# Patient Record
Sex: FEMALE | Race: WHITE | NOT HISPANIC OR LATINO | URBAN - METROPOLITAN AREA
[De-identification: names, ages, dates, MRNs, and addresses within clinical notes are randomized per-mention and may not be internally consistent; named-entity substitution may affect disease eponyms.]

---

## 2017-10-24 ENCOUNTER — OUTPATIENT (OUTPATIENT)
Dept: OUTPATIENT SERVICES | Facility: HOSPITAL | Age: 77
LOS: 1 days | End: 2017-10-24

## 2019-04-28 ENCOUNTER — EMERGENCY (EMERGENCY)
Facility: HOSPITAL | Age: 79
LOS: 1 days | End: 2019-04-28
Payer: MEDICARE

## 2019-04-28 PROCEDURE — 93010 ELECTROCARDIOGRAM REPORT: CPT

## 2019-04-28 PROCEDURE — 71045 X-RAY EXAM CHEST 1 VIEW: CPT | Mod: 26

## 2019-04-28 PROCEDURE — 99285 EMERGENCY DEPT VISIT HI MDM: CPT

## 2019-04-29 PROCEDURE — 93010 ELECTROCARDIOGRAM REPORT: CPT

## 2019-06-25 ENCOUNTER — OUTPATIENT (OUTPATIENT)
Dept: OUTPATIENT SERVICES | Facility: HOSPITAL | Age: 79
LOS: 1 days | End: 2019-06-25

## 2019-06-25 PROCEDURE — 71045 X-RAY EXAM CHEST 1 VIEW: CPT | Mod: 26

## 2019-06-25 PROCEDURE — 99285 EMERGENCY DEPT VISIT HI MDM: CPT

## 2019-06-25 PROCEDURE — 70450 CT HEAD/BRAIN W/O DYE: CPT | Mod: 26

## 2019-06-26 ENCOUNTER — INPATIENT (INPATIENT)
Facility: HOSPITAL | Age: 79
LOS: 4 days | Discharge: HOME HEALTH | End: 2019-07-01
Admitting: FAMILY MEDICINE
Payer: MEDICARE

## 2019-06-26 ENCOUNTER — OUTPATIENT (OUTPATIENT)
Dept: OUTPATIENT SERVICES | Facility: HOSPITAL | Age: 79
LOS: 1 days | End: 2019-06-26

## 2019-06-26 PROCEDURE — 93459 L HRT ART/GRFT ANGIO: CPT | Mod: 26

## 2019-06-27 ENCOUNTER — OUTPATIENT (OUTPATIENT)
Dept: OUTPATIENT SERVICES | Facility: HOSPITAL | Age: 79
LOS: 1 days | End: 2019-06-27

## 2019-06-27 PROBLEM — Z00.00 ENCOUNTER FOR PREVENTIVE HEALTH EXAMINATION: Status: ACTIVE | Noted: 2019-06-27

## 2019-06-27 PROCEDURE — 72192 CT PELVIS W/O DYE: CPT | Mod: 26

## 2019-06-27 PROCEDURE — 93010 ELECTROCARDIOGRAM REPORT: CPT

## 2019-06-27 PROCEDURE — 93880 EXTRACRANIAL BILAT STUDY: CPT | Mod: 26

## 2019-06-27 PROCEDURE — 72131 CT LUMBAR SPINE W/O DYE: CPT | Mod: 26

## 2019-06-28 ENCOUNTER — OUTPATIENT (OUTPATIENT)
Dept: OUTPATIENT SERVICES | Facility: HOSPITAL | Age: 79
LOS: 1 days | End: 2019-06-28

## 2019-06-29 PROCEDURE — 71045 X-RAY EXAM CHEST 1 VIEW: CPT | Mod: 26

## 2019-06-30 ENCOUNTER — OUTPATIENT (OUTPATIENT)
Dept: OUTPATIENT SERVICES | Facility: HOSPITAL | Age: 79
LOS: 1 days | End: 2019-06-30

## 2019-07-01 ENCOUNTER — OUTPATIENT (OUTPATIENT)
Dept: OUTPATIENT SERVICES | Facility: HOSPITAL | Age: 79
LOS: 1 days | End: 2019-07-01

## 2019-08-09 ENCOUNTER — APPOINTMENT (OUTPATIENT)
Dept: RADIOLOGY | Facility: CLINIC | Age: 79
End: 2019-08-09
Payer: MEDICARE

## 2019-08-09 PROCEDURE — 77080 DXA BONE DENSITY AXIAL: CPT

## 2019-09-10 ENCOUNTER — OUTPATIENT (OUTPATIENT)
Dept: OUTPATIENT SERVICES | Facility: HOSPITAL | Age: 79
LOS: 1 days | Discharge: HOME | End: 2019-09-10

## 2019-09-22 PROCEDURE — 99285 EMERGENCY DEPT VISIT HI MDM: CPT

## 2019-09-22 PROCEDURE — 71045 X-RAY EXAM CHEST 1 VIEW: CPT | Mod: 26

## 2019-09-22 PROCEDURE — 93010 ELECTROCARDIOGRAM REPORT: CPT

## 2019-09-24 ENCOUNTER — INPATIENT (INPATIENT)
Facility: HOSPITAL | Age: 79
LOS: 0 days | Discharge: HOME | End: 2019-09-25
Admitting: INTERNAL MEDICINE
Payer: MEDICARE

## 2019-09-24 ENCOUNTER — OUTPATIENT (OUTPATIENT)
Dept: OUTPATIENT SERVICES | Facility: HOSPITAL | Age: 79
LOS: 1 days | End: 2019-09-24

## 2019-09-25 ENCOUNTER — OUTPATIENT (OUTPATIENT)
Dept: OUTPATIENT SERVICES | Facility: HOSPITAL | Age: 79
LOS: 1 days | End: 2019-09-25

## 2019-10-01 ENCOUNTER — INPATIENT (INPATIENT)
Facility: HOSPITAL | Age: 79
LOS: 0 days | Discharge: ANOTHER HOSPITAL | End: 2019-10-02
Payer: MEDICARE

## 2019-10-01 ENCOUNTER — OUTPATIENT (OUTPATIENT)
Dept: OUTPATIENT SERVICES | Facility: HOSPITAL | Age: 79
LOS: 1 days | End: 2019-10-01

## 2019-10-01 PROCEDURE — 99285 EMERGENCY DEPT VISIT HI MDM: CPT

## 2019-10-01 PROCEDURE — 71045 X-RAY EXAM CHEST 1 VIEW: CPT | Mod: 26

## 2019-10-02 PROCEDURE — 93010 ELECTROCARDIOGRAM REPORT: CPT | Mod: 76

## 2019-12-02 ENCOUNTER — INPATIENT (INPATIENT)
Facility: HOSPITAL | Age: 79
LOS: 0 days | Discharge: ANOTHER HOSPITAL | End: 2019-12-03
Attending: INTERNAL MEDICINE | Admitting: INTERNAL MEDICINE
Payer: MEDICARE

## 2019-12-02 PROCEDURE — 71045 X-RAY EXAM CHEST 1 VIEW: CPT | Mod: 26

## 2019-12-02 PROCEDURE — 99223 1ST HOSP IP/OBS HIGH 75: CPT

## 2019-12-02 PROCEDURE — 99285 EMERGENCY DEPT VISIT HI MDM: CPT

## 2019-12-03 PROCEDURE — 93306 TTE W/DOPPLER COMPLETE: CPT | Mod: 26

## 2020-01-02 ENCOUNTER — OUTPATIENT (OUTPATIENT)
Dept: OUTPATIENT SERVICES | Facility: HOSPITAL | Age: 80
LOS: 1 days | End: 2020-01-02

## 2020-01-02 PROCEDURE — 71045 X-RAY EXAM CHEST 1 VIEW: CPT | Mod: 26

## 2020-01-02 PROCEDURE — 71260 CT THORAX DX C+: CPT | Mod: 26

## 2020-01-02 PROCEDURE — 74177 CT ABD & PELVIS W/CONTRAST: CPT | Mod: 26

## 2020-01-02 PROCEDURE — 72070 X-RAY EXAM THORAC SPINE 2VWS: CPT | Mod: 26

## 2020-01-02 PROCEDURE — 99285 EMERGENCY DEPT VISIT HI MDM: CPT

## 2020-01-02 PROCEDURE — 72170 X-RAY EXAM OF PELVIS: CPT | Mod: 26

## 2020-01-02 PROCEDURE — 70450 CT HEAD/BRAIN W/O DYE: CPT | Mod: 26

## 2020-01-02 PROCEDURE — 72125 CT NECK SPINE W/O DYE: CPT | Mod: 26

## 2020-01-03 ENCOUNTER — INPATIENT (INPATIENT)
Facility: HOSPITAL | Age: 80
LOS: 4 days | Discharge: NURSING, REHAB OR LONG-TERM CARE | End: 2020-01-08
Admitting: STUDENT IN AN ORGANIZED HEALTH CARE EDUCATION/TRAINING PROGRAM
Payer: MEDICARE

## 2020-01-03 PROCEDURE — 99221 1ST HOSP IP/OBS SF/LOW 40: CPT

## 2020-01-04 ENCOUNTER — OUTPATIENT (OUTPATIENT)
Dept: OUTPATIENT SERVICES | Facility: HOSPITAL | Age: 80
LOS: 1 days | End: 2020-01-04

## 2020-01-05 ENCOUNTER — OUTPATIENT (OUTPATIENT)
Dept: OUTPATIENT SERVICES | Facility: HOSPITAL | Age: 80
LOS: 1 days | End: 2020-01-05

## 2020-01-07 ENCOUNTER — OUTPATIENT (OUTPATIENT)
Dept: OUTPATIENT SERVICES | Facility: HOSPITAL | Age: 80
LOS: 1 days | End: 2020-01-07

## 2020-01-10 ENCOUNTER — OUTPATIENT (OUTPATIENT)
Dept: OUTPATIENT SERVICES | Facility: HOSPITAL | Age: 80
LOS: 1 days | End: 2020-01-10

## 2020-01-27 ENCOUNTER — OUTPATIENT (OUTPATIENT)
Dept: OUTPATIENT SERVICES | Facility: HOSPITAL | Age: 80
LOS: 1 days | End: 2020-01-27

## 2020-03-09 ENCOUNTER — OUTPATIENT (OUTPATIENT)
Dept: OUTPATIENT SERVICES | Facility: HOSPITAL | Age: 80
LOS: 1 days | End: 2020-03-09

## 2020-08-02 ENCOUNTER — OUTPATIENT (OUTPATIENT)
Dept: OUTPATIENT SERVICES | Facility: HOSPITAL | Age: 80
LOS: 1 days | End: 2020-08-02

## 2020-08-02 ENCOUNTER — INPATIENT (INPATIENT)
Facility: HOSPITAL | Age: 80
LOS: 0 days | Discharge: HOME | End: 2020-08-03
Attending: FAMILY MEDICINE | Admitting: FAMILY MEDICINE
Payer: MEDICARE

## 2020-08-02 PROCEDURE — 71045 X-RAY EXAM CHEST 1 VIEW: CPT | Mod: 26

## 2020-08-02 PROCEDURE — 99285 EMERGENCY DEPT VISIT HI MDM: CPT | Mod: CS

## 2020-08-03 ENCOUNTER — OUTPATIENT (OUTPATIENT)
Dept: OUTPATIENT SERVICES | Facility: HOSPITAL | Age: 80
LOS: 1 days | End: 2020-08-03

## 2020-09-10 ENCOUNTER — APPOINTMENT (OUTPATIENT)
Dept: RADIOLOGY | Facility: CLINIC | Age: 80
End: 2020-09-10
Payer: MEDICARE

## 2020-09-10 PROCEDURE — 74240 X-RAY XM UPR GI TRC 1CNTRST: CPT

## 2020-11-15 ENCOUNTER — TRANSCRIPTION ENCOUNTER (OUTPATIENT)
Age: 80
End: 2020-11-15

## 2021-03-16 ENCOUNTER — APPOINTMENT (OUTPATIENT)
Dept: CT IMAGING | Facility: CLINIC | Age: 81
End: 2021-03-16
Payer: MEDICARE

## 2021-03-16 PROCEDURE — 70498 CT ANGIOGRAPHY NECK: CPT | Mod: MH

## 2021-03-16 PROCEDURE — Q9967B: CUSTOM

## 2021-03-16 PROCEDURE — 82565A: CUSTOM | Mod: QW

## 2021-04-20 ENCOUNTER — APPOINTMENT (OUTPATIENT)
Dept: ULTRASOUND IMAGING | Facility: CLINIC | Age: 81
End: 2021-04-20
Payer: MEDICARE

## 2021-04-20 PROCEDURE — 93971 EXTREMITY STUDY: CPT | Mod: RT

## 2021-06-26 ENCOUNTER — EMERGENCY (EMERGENCY)
Facility: HOSPITAL | Age: 81
LOS: 1 days | End: 2021-06-26
Admitting: EMERGENCY MEDICINE
Payer: MEDICARE

## 2021-06-26 ENCOUNTER — INPATIENT (INPATIENT)
Facility: HOSPITAL | Age: 81
LOS: 3 days | Discharge: HOME | DRG: 378 | End: 2021-06-30
Attending: HOSPITALIST | Admitting: HOSPITALIST
Payer: MEDICARE

## 2021-06-26 VITALS
TEMPERATURE: 98 F | RESPIRATION RATE: 20 BRPM | HEART RATE: 85 BPM | DIASTOLIC BLOOD PRESSURE: 68 MMHG | HEIGHT: 62 IN | WEIGHT: 115.08 LBS | OXYGEN SATURATION: 97 % | SYSTOLIC BLOOD PRESSURE: 114 MMHG

## 2021-06-26 DIAGNOSIS — K92.2 GASTROINTESTINAL HEMORRHAGE, UNSPECIFIED: ICD-10-CM

## 2021-06-26 DIAGNOSIS — Z98.890 OTHER SPECIFIED POSTPROCEDURAL STATES: Chronic | ICD-10-CM

## 2021-06-26 DIAGNOSIS — Z95.1 PRESENCE OF AORTOCORONARY BYPASS GRAFT: Chronic | ICD-10-CM

## 2021-06-26 DIAGNOSIS — Z95.5 PRESENCE OF CORONARY ANGIOPLASTY IMPLANT AND GRAFT: Chronic | ICD-10-CM

## 2021-06-26 LAB
ALBUMIN SERPL ELPH-MCNC: 4.3 G/DL — SIGNIFICANT CHANGE UP (ref 3.3–5.2)
ALP SERPL-CCNC: 52 U/L — SIGNIFICANT CHANGE UP (ref 40–120)
ALT FLD-CCNC: 8 U/L — SIGNIFICANT CHANGE UP
ANION GAP SERPL CALC-SCNC: 13 MMOL/L — SIGNIFICANT CHANGE UP (ref 5–17)
APTT BLD: 32.7 SEC — SIGNIFICANT CHANGE UP (ref 27.5–35.5)
AST SERPL-CCNC: 15 U/L — SIGNIFICANT CHANGE UP
BASOPHILS # BLD AUTO: 0.05 K/UL — SIGNIFICANT CHANGE UP (ref 0–0.2)
BASOPHILS NFR BLD AUTO: 0.4 % — SIGNIFICANT CHANGE UP (ref 0–2)
BILIRUB SERPL-MCNC: 0.6 MG/DL — SIGNIFICANT CHANGE UP (ref 0.4–2)
BLD GP AB SCN SERPL QL: SIGNIFICANT CHANGE UP
BUN SERPL-MCNC: 43.4 MG/DL — HIGH (ref 8–20)
CALCIUM SERPL-MCNC: 10.1 MG/DL — SIGNIFICANT CHANGE UP (ref 8.6–10.2)
CHLORIDE SERPL-SCNC: 96 MMOL/L — LOW (ref 98–107)
CO2 SERPL-SCNC: 26 MMOL/L — SIGNIFICANT CHANGE UP (ref 22–29)
CREAT SERPL-MCNC: 0.98 MG/DL — SIGNIFICANT CHANGE UP (ref 0.5–1.3)
EOSINOPHIL # BLD AUTO: 0.1 K/UL — SIGNIFICANT CHANGE UP (ref 0–0.5)
EOSINOPHIL NFR BLD AUTO: 0.9 % — SIGNIFICANT CHANGE UP (ref 0–6)
GLUCOSE SERPL-MCNC: 85 MG/DL — SIGNIFICANT CHANGE UP (ref 70–99)
HCT VFR BLD CALC: 29.9 % — LOW (ref 34.5–45)
HCT VFR BLD CALC: 31.9 % — LOW (ref 34.5–45)
HGB BLD-MCNC: 10.9 G/DL — LOW (ref 11.5–15.5)
HGB BLD-MCNC: 9.9 G/DL — LOW (ref 11.5–15.5)
IMM GRANULOCYTES NFR BLD AUTO: 0.4 % — SIGNIFICANT CHANGE UP (ref 0–1.5)
INR BLD: 1.16 RATIO — SIGNIFICANT CHANGE UP (ref 0.88–1.16)
LYMPHOCYTES # BLD AUTO: 1.83 K/UL — SIGNIFICANT CHANGE UP (ref 1–3.3)
LYMPHOCYTES # BLD AUTO: 16.4 % — SIGNIFICANT CHANGE UP (ref 13–44)
MCHC RBC-ENTMCNC: 30.4 PG — SIGNIFICANT CHANGE UP (ref 27–34)
MCHC RBC-ENTMCNC: 30.7 PG — SIGNIFICANT CHANGE UP (ref 27–34)
MCHC RBC-ENTMCNC: 33.1 GM/DL — SIGNIFICANT CHANGE UP (ref 32–36)
MCHC RBC-ENTMCNC: 34.2 GM/DL — SIGNIFICANT CHANGE UP (ref 32–36)
MCV RBC AUTO: 89.9 FL — SIGNIFICANT CHANGE UP (ref 80–100)
MCV RBC AUTO: 91.7 FL — SIGNIFICANT CHANGE UP (ref 80–100)
MONOCYTES # BLD AUTO: 1.05 K/UL — HIGH (ref 0–0.9)
MONOCYTES NFR BLD AUTO: 9.4 % — SIGNIFICANT CHANGE UP (ref 2–14)
NEUTROPHILS # BLD AUTO: 8.12 K/UL — HIGH (ref 1.8–7.4)
NEUTROPHILS NFR BLD AUTO: 72.5 % — SIGNIFICANT CHANGE UP (ref 43–77)
NT-PROBNP SERPL-SCNC: 342 PG/ML — HIGH (ref 0–300)
PLATELET # BLD AUTO: 186 K/UL — SIGNIFICANT CHANGE UP (ref 150–400)
PLATELET # BLD AUTO: 198 K/UL — SIGNIFICANT CHANGE UP (ref 150–400)
POTASSIUM SERPL-MCNC: 4 MMOL/L — SIGNIFICANT CHANGE UP (ref 3.5–5.3)
POTASSIUM SERPL-SCNC: 4 MMOL/L — SIGNIFICANT CHANGE UP (ref 3.5–5.3)
PROT SERPL-MCNC: 6.8 G/DL — SIGNIFICANT CHANGE UP (ref 6.6–8.7)
PROTHROM AB SERPL-ACNC: 13.4 SEC — SIGNIFICANT CHANGE UP (ref 10.6–13.6)
RBC # BLD: 3.26 M/UL — LOW (ref 3.8–5.2)
RBC # BLD: 3.55 M/UL — LOW (ref 3.8–5.2)
RBC # FLD: 11.7 % — SIGNIFICANT CHANGE UP (ref 10.3–14.5)
RBC # FLD: 11.9 % — SIGNIFICANT CHANGE UP (ref 10.3–14.5)
SODIUM SERPL-SCNC: 135 MMOL/L — SIGNIFICANT CHANGE UP (ref 135–145)
TROPONIN T SERPL-MCNC: <0.01 NG/ML — SIGNIFICANT CHANGE UP (ref 0–0.06)
TROPONIN T SERPL-MCNC: <0.01 NG/ML — SIGNIFICANT CHANGE UP (ref 0–0.06)
WBC # BLD: 11.19 K/UL — HIGH (ref 3.8–10.5)
WBC # BLD: 12.06 K/UL — HIGH (ref 3.8–10.5)
WBC # FLD AUTO: 11.19 K/UL — HIGH (ref 3.8–10.5)
WBC # FLD AUTO: 12.06 K/UL — HIGH (ref 3.8–10.5)

## 2021-06-26 PROCEDURE — 99223 1ST HOSP IP/OBS HIGH 75: CPT | Mod: AI

## 2021-06-26 PROCEDURE — 99285 EMERGENCY DEPT VISIT HI MDM: CPT

## 2021-06-26 PROCEDURE — 71045 X-RAY EXAM CHEST 1 VIEW: CPT | Mod: 26

## 2021-06-26 PROCEDURE — 93010 ELECTROCARDIOGRAM REPORT: CPT

## 2021-06-26 PROCEDURE — 99223 1ST HOSP IP/OBS HIGH 75: CPT

## 2021-06-26 PROCEDURE — 99233 SBSQ HOSP IP/OBS HIGH 50: CPT

## 2021-06-26 PROCEDURE — 71045 X-RAY EXAM CHEST 1 VIEW: CPT | Mod: 26,76

## 2021-06-26 RX ORDER — ONDANSETRON 8 MG/1
4 TABLET, FILM COATED ORAL ONCE
Refills: 0 | Status: COMPLETED | OUTPATIENT
Start: 2021-06-26 | End: 2021-06-26

## 2021-06-26 RX ORDER — ONDANSETRON 8 MG/1
4 TABLET, FILM COATED ORAL EVERY 8 HOURS
Refills: 0 | Status: DISCONTINUED | OUTPATIENT
Start: 2021-06-26 | End: 2021-06-30

## 2021-06-26 RX ORDER — ATORVASTATIN CALCIUM 80 MG/1
10 TABLET, FILM COATED ORAL AT BEDTIME
Refills: 0 | Status: DISCONTINUED | OUTPATIENT
Start: 2021-06-26 | End: 2021-06-30

## 2021-06-26 RX ORDER — PANTOPRAZOLE SODIUM 20 MG/1
8 TABLET, DELAYED RELEASE ORAL
Qty: 80 | Refills: 0 | Status: DISCONTINUED | OUTPATIENT
Start: 2021-06-26 | End: 2021-06-29

## 2021-06-26 RX ORDER — ATORVASTATIN CALCIUM 80 MG/1
10 TABLET, FILM COATED ORAL DAILY
Refills: 0 | Status: DISCONTINUED | OUTPATIENT
Start: 2021-06-26 | End: 2021-06-26

## 2021-06-26 RX ORDER — LOSARTAN POTASSIUM 100 MG/1
100 TABLET, FILM COATED ORAL DAILY
Refills: 0 | Status: DISCONTINUED | OUTPATIENT
Start: 2021-06-26 | End: 2021-06-26

## 2021-06-26 RX ADMIN — ONDANSETRON 4 MILLIGRAM(S): 8 TABLET, FILM COATED ORAL at 15:02

## 2021-06-26 RX ADMIN — ATORVASTATIN CALCIUM 10 MILLIGRAM(S): 80 TABLET, FILM COATED ORAL at 20:32

## 2021-06-26 RX ADMIN — PANTOPRAZOLE SODIUM 10 MG/HR: 20 TABLET, DELAYED RELEASE ORAL at 15:02

## 2021-06-26 NOTE — H&P ADULT - ASSESSMENT
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 Additionally, reports BP lower than normal at home. Transferred to [facility] for further GI workup.     Melena   -trend H/H   -Hgb11.2 in am at [facility] [place]  -cont IV PPI   -holding asa, plavix   -f/u GI consult     Chest pain  CAD s/p CABG x3, s/p multiple PCI  PVD   HTN  -trend trops   -cardio recs appreciated   -holding asa, plavix   -f/u TTE   -cont lipitor   -pt on HCTZ-losartan outpt, will resume losartan if BP increasing

## 2021-06-26 NOTE — H&P ADULT - HISTORY OF PRESENT ILLNESS
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 Additionally, reports BP lower than normal at home. Transferred to [facility] for further GI workup.   Pt reports hx of multiple years of "GI problems," involving multiple BMs per day, abd cramping, intermittent nausea. Had been following with outpt GI but told she was high risk for EGD/colonoscopy. Denies ever having EGD or colonoscopy. Denies hx of BRBPR or melena until yesterday. At time of exam, pt reports chest pain resolved. denies dyspnea, though she has "not been doing anything." still with mild abd pain, nausea. Denies recent fevers, chills, dysuria

## 2021-06-26 NOTE — ED PROVIDER NOTE - ATTENDING CONTRIBUTION TO CARE
I, Herman Schwartz, performed a face to face bedside interview with this patient regarding history of present illness, review of symptoms and relevant past medical, social and family history.  I completed an independent physical examination. I have communicated the patient’s plan of care and disposition with the resident.  81 year old female with PMH CAD s/p CABG and multiple PCI, PAD, HTN, HLD, carotid endarterectomy [place] [facility] [place] Pt reports black stools starting yesterday. Mild abd cramping, but no vomitign, fevers, chills. also endroses chest pressure as well.  Gen: NAD, well appearing  CV: RRR  Pul: CTA b/l  Abd: Soft, non-distended, non-tender  Neuro: no focal deficits  Pt admitted for GI bleed

## 2021-06-26 NOTE — CONSULT NOTE ADULT - ATTENDING COMMENTS
Patient with chest pain likely secondary to demand ischemia in the setting of anemia and GI bleed  Trend cardiac enzymes, EKGs  Telemetry monitoring  TTE  Obtain records of patient's prior cardiac workup  Continue medications as above     Discussed with Dr. Mills    Thank you for allowing me to participate in the care of this patient  Will continue to follow

## 2021-06-26 NOTE — H&P ADULT - NSICDXPASTMEDICALHX_GEN_ALL_CORE_FT
PAST MEDICAL HISTORY:  Cholelithiasis     Coronary artery graft present     HLD (hyperlipidemia)     HTN (hypertension)     PVD (peripheral vascular disease)     SDH (subdural hematoma)

## 2021-06-26 NOTE — H&P ADULT - NSICDXPASTSURGICALHX_GEN_ALL_CORE_FT
PAST SURGICAL HISTORY:  History of right-sided carotid endarterectomy 2013    History of surgery for cerebral aneurysm s/p coiling of ruptured aneurysm, 2016    S/P CABG x 3 2013    S/P coronary artery stent placement x2 (2/2018), x1 (12/2018), x2 (2019), x1 (2019)

## 2021-06-26 NOTE — CONSULT NOTE ADULT - SUBJECTIVE AND OBJECTIVE BOX
HPI:  81y/oF PMH CAD s/p CABG x3 (3/2013), multiple PCI/HOLGER (most recently 10/2019), ruptured cerebral aneurysm (s/p coiling 5/2016), carotid stenosis (s/p R CEA 2013), PVD s/p iliac stents x3, HTN, HLD,  poss IBS [place] [facility] [place], dyspnea after 3 bouts of alleged melena, dark stools x 3 on 6/25.  . Additionally, reports BP lower than normal at home. Noted with Hgb of 13 then 11 and + FOBT [place] [facility]  Transferred to [facility] for further GI workup.   Pt reports hx of multiple years of "GI problems," involving multiple BMs per day, erratic BM's.  Some abd cramping, intermittent nausea. Had been following with Lyndsay Nelson,  but told she was high risk for EGD/colonoscopy. Denies ever having EGD or colonoscopy.Attempt at virtual colonoscopy was aborted due to intolerance of the prep.   Denies hx of BRBPR or melena until yesterday. At time of exam, pt reports chest pain resolved. Denies dyspnea, though she has "not been doing anything." still with mild abd pain, nausea. Denies recent fevers, chills, dysuria Denies heartburn or dyspepsia.  No dysphagia or odynophagia.  No recent weight changes.  Alleges that Cath 6 months ago was negative.  No recent use of iron or peptobismol.    Compliant with basic medications.        PAST MEDICAL & SURGICAL HISTORY:  HTN (hypertension)    HLD (hyperlipidemia)    PVD (peripheral vascular disease)    SDH (subdural hematoma)    Coronary artery graft present    Cholelithiasis; Hemorrhoids.     S/P CABG x 3  2013    History of right-sided carotid endarterectomy  2013    History of surgery for cerebral aneurysm  s/p coiling of ruptured aneurysm, 2016    S/P coronary artery stent placement  x2 (2/2018), x1 (12/2018), x2 (2019), x1 (2019)        ROS:  No Heartburn, regurgitation, dysphagia, odynophagia.  No dyspepsia  No abdominal pain.    No Nausea, vomiting.  No Bleeding.  No hematemesis.   No diarrhea.    No hematochezia.  No weight loss, anorexia.  No edema.      MEDICATIONS  (STANDING):  atorvastatin 10 milliGRAM(s) Oral at bedtime  pantoprazole Infusion 8 mG/Hr (10 mL/Hr) IV Continuous <Continuous>    MEDICATIONS  (PRN):  ondansetron Injectable 4 milliGRAM(s) IV Push every 8 hours PRN Nausea and/or Vomiting      Allergies  No Known Allergies    SOCIAL HISTORY:NC    FAMILY HISTORY:NC  No pertinent family history in first degree relatives        Vital Signs Last 24 Hrs  T(C): 36.6 (26 Jun 2021 16:09), Max: 36.7 (26 Jun 2021 11:54)  T(F): 97.9 (26 Jun 2021 16:09), Max: 98.1 (26 Jun 2021 11:54)  HR: 80 (26 Jun 2021 16:09) (80 - 85)  BP: 99/63 (26 Jun 2021 16:09) (99/63 - 114/68)  BP(mean): --  RR: 20 (26 Jun 2021 11:54) (20 - 20)  SpO2: 97% (26 Jun 2021 11:54) (97% - 97%)    PHYSICAL EXAM:    GENERAL: NAD, well-groomed, well-developed  HEAD:  Atraumatic, Normocephalic  EYES: EOMI, PERRLA, conjunctiva and sclera clear  ENMT: No tonsillar erythema, exudates, or enlargement; Moist mucous membranes, Good dentition, No lesions  NECK: Supple, No JVD, Normal thyroid  CHEST/LUNG: Clear to percussion bilaterally; No rales, rhonchi, wheezing, or rubs  HEART: Regular rate and rhythm; No murmurs, rubs, or gallops  ABDOMEN: Soft, Nontender, Nondistended; Bowel sounds present;  No HSM.  No MTR.  No hernias. KRISTEN:  + Ext Hemorrhoids.  No lesions Firm brown stool in rectal vault.  No impaction.  No lesions palpable.    EXTREMITIES:  2+ Peripheral Pulses, No clubbing, cyanosis, or edema  LYMPH: No lymphadenopathy noted  SKIN: No rashes or lesions      LABS:                        10.9   11.19 )-----------( 198      ( 26 Jun 2021 15:12 )             31.9     06-26    135  |  96<L>  |  43.4<H>  ----------------------------<  85  4.0   |  26.0  |  0.98    Ca    10.1      26 Jun 2021 15:13    TPro  6.8  /  Alb  4.3  /  TBili  0.6  /  DBili  x   /  AST  15  /  ALT  8   /  AlkPhos  52  06-26    PT/INR - ( 26 Jun 2021 15:13 )   PT: 13.4 sec;   INR: 1.16 ratio         PTT - ( 26 Jun 2021 15:13 )  PTT:32.7 sec       LIVER FUNCTIONS - ( 26 Jun 2021 15:13 )  Alb: 4.3 g/dL / Pro: 6.8 g/dL / ALK PHOS: 52 U/L / ALT: 8 U/L / AST: 15 U/L / GGT: x               RADIOLOGY & ADDITIONAL STUDIES:
                                                                    [facility]                                                               Office:  [address] [address]                                                              Telephone: [phone]. Fax:[phone]                                                                        CARDIOLOGY CONSULTATION NOTE                                                                                             Consult requested by:  Dr. Mills  Reason for Consultation: Chest Pain  Primary Cardiologist: [facility]  History obtained by: Patient and medical record   obtained: No    Covid Status: Pending    Chief complaint:    Patient is a 81y old  Female who presents with a chief complaint of GI bleed.      HPI: 80 y/o F with a PMHx of CAD s/p CABG x 3 (03/2013) and multiple PCI/HOLGER (last 12/03/19), cerebral aneurysm (s/p coiling 05/23/16), carotid stenosis (s/p right carotid endartectomy 03/2013), PVD (s/p multiple iliac stents, HTN, HLD, and IBS who presented to [facility] [place] with complaints of melena, chest pain, and dyspnea. Patient states that she has been having frequent bowel movements for years, and has been following with a GI doctor for IBS. Patient was told that she was high risk for an endoscopy or colonoscopy due to her comorbidities and has been managed conservatively. Patient states that she began yesterday to have multiple episodes of black stools, and felt like her blood pressure might be low. Patient states that she also began to have some chest pain and shortness of breath. Patient states she at baseline gets some chest discomfort when she exerts herself, and is prescribed NTG at home PRN. Patient was transferred to [facility] for further GI workup. Patient is resting comfortably, still has some abdominal pain and mild chest pressure. Patient's bloodwork is still pending. Patient denies fevers, chills, syncope, N/V, headache, or dizziness.     REVIEW OF SYMPTOMS:     CONSTITUTIONAL: No fever, weight loss, or fatigue  ENMT:  No difficulty hearing, tinnitus, vertigo; No sinus or throat pain  NECK: No pain or stiffness  CARDIOVASCULAR: AS PER HPI  RESPIRATORY: AS PER HPI  : No dysuria, no hematuria   GI: AS PER HPI  NEURO: No headache, no dizziness, no slurred speech   MUSCULOSKELETAL: No joint pain or swelling; No muscle, back, or extremity pain  PSYCH: No agitation, no anxiety.    ALL OTHER REVIEW OF SYSTEMS ARE NEGATIVE.      PREVIOUS DIAGNOSTIC TESTING  ECHO FINDINGS:  Pending      ALLERGIES: Allergies    No Known Allergies    Intolerances      PAST MEDICAL HISTORY  HTN (hypertension)    HLD (hyperlipidemia)    IBS (irritable bowel syndrome)    PAD (peripheral artery disease)    PVD (peripheral vascular disease)    SDH (subdural hematoma)    Coronary artery graft present        PAST SURGICAL HISTORY      FAMILY HISTORY:      SOCIAL HISTORY:   CIGARETTES:   Former smoker, quit 2013  ALCOHOL: Denies  DRUGS: Denies      CURRENT MEDICATIONS:     pantoprazole Infusion      HOME MEDICATIONS:  Home Medications:      Vital Signs Last 24 Hrs  T(C): 36.7 (26 Jun 2021 11:54), Max: 36.7 (26 Jun 2021 11:54)  T(F): 98.1 (26 Jun 2021 11:54), Max: 98.1 (26 Jun 2021 11:54)  HR: 85 (26 Jun 2021 11:54) (85 - 85)  BP: 114/68 (26 Jun 2021 11:54) (114/68 - 114/68)  RR: 20 (26 Jun 2021 11:54) (20 - 20)  SpO2: 97% (26 Jun 2021 11:54) (97% - 97%)      PHYSICAL EXAM:  Constitutional: Comfortable . No acute distress.   HEENT: Atraumatic and normocephalic , neck is supple . no JVD. No carotid bruit. PEERL   CNS: A&Ox3. No focal deficits. EOMI. Cranial nerves II-IX are intact.   Lymph Nodes: Cervical : Not palpable.  Respiratory: CTAB  Cardiovascular: S1S2 RRR. No rubs or gallop. III/VI systolic murmur lsb  Gastrointestinal: Soft non-tender and non distended . +Bowel sounds. negative Enamorado's sign.  Extremities: No edema.   Psychiatric: Calm . no agitation.  Skin: No skin rash/ulcers visualized to face, hands or feet.    Intake and output:     LABS:                        10.9   11.19 )-----------( 198      ( 26 Jun 2021 15:12 )             31.9             ;p-BNP=        INTERPRETATION OF TELEMETRY: Reviewed by me.   ECG: Reviewed by me. Pending    RADIOLOGY & ADDITIONAL STUDIES:    X-ray:  reviewed by me.   CT scan:   MRI:

## 2021-06-26 NOTE — H&P ADULT - NSHPPHYSICALEXAM_GEN_ALL_CORE
CONSTITUTIONAL: NAD  EYES: +EOMI  CARDIAC: Regular rate, regular rhythm.  normal +S1, S2.  +systolic murmur   RESPIRATORY: Clear to auscultation bilaterally, no wheezes noted  GASTROINTESTINAL: Abdomen soft, non-distended; mildly TTP umbilical/hypogastrium; no rebound   NEUROLOGICAL: Alert and oriented, no focal deficits.  SKIN: warm, dry, no rashes noted

## 2021-06-26 NOTE — H&P ADULT - NSHPLABSRESULTS_GEN_ALL_CORE
10.9   11.19 )-----------( 198      ( 26 Jun 2021 15:12 )             31.9   06-26    135  |  96<L>  |  43.4<H>  ----------------------------<  85  4.0   |  26.0  |  0.98    Ca    10.1      26 Jun 2021 15:13    TPro  6.8  /  Alb  4.3  /  TBili  0.6  /  DBili  x   /  AST  15  /  ALT  8   /  AlkPhos  52  06-26

## 2021-06-26 NOTE — ED PROVIDER NOTE - PMH
Coronary artery graft present    HLD (hyperlipidemia)    HTN (hypertension)    IBS (irritable bowel syndrome)    PAD (peripheral artery disease)    PVD (peripheral vascular disease)    SDH (subdural hematoma)

## 2021-06-26 NOTE — ED PROVIDER NOTE - OBJECTIVE STATEMENT
Pt is an 82 y/o F w/PMHx from chart review of HTN HLD, IBS, PAD s/p carotid enterectomy, hx of lower ext PVD, hx of SDH s/p rupture and repair (5/2016) complex cardiac history including CABG in 2013, multiple PCIs, redo PCI for LCx and diagonal branch for recurrent in-stent restenosis in 4/2019 and in 10/2019 presents as transfer from [facility] [place] c/o GI bleed.  Pt states she had three episodes of dark stool yesterday, and attributed it to her IBS.  Today she went outside and took her blood pressure and states that she thought it might be a little low, she routinely checks and know that she trends elevated, and has had near syncopal episodes in the past when her pressure was low.  She became concerned after experiencing some chest discomfort and shortness of breath coupled with her cardiac history and went to the ED for evaluation.  She was found to have a GI bleed, sarted on a protonix drip and transferred to [facility] for GI evaluation.

## 2021-06-26 NOTE — ED ADULT TRIAGE NOTE - CHIEF COMPLAINT QUOTE
Patient brought in by ambulance A/Ox3, transfer from [facility] for GI bleed, reports she feels nauseous, on protonix drip.

## 2021-06-26 NOTE — CONSULT NOTE ADULT - ASSESSMENT
Dark stools.  + FOBT;  Hx of IBS.  No prior EGD or colonoscopy exam.  Avg risk for CR neoplasia.    Minor anemia.  Not much change to date.    Brown stool on current exam.    Plan Serial BW.  Already on PPI drip.  Cards clearance.  EGD on Monday.  If negative the consider for colonoscopy.  
A/P: 82 y/o F with a PMHx of CAD s/p CABG x 3 (03/2013) and multiple PCI/HOLGER (last 12/03/19), cerebral aneurysm (s/p coiling 05/23/16), carotid stenosis (s/p right carotid endartectomy 03/2013), PVD (s/p multiple iliac stents, HTN, HLD, and IBS who [place] [facility] of melena, chest pain, and dyspnea. Patient states that she has been having frequent bowel movements for years, and has been following with a GI doctor for IBS. Patient was told that she was high risk for an endoscopy or colonoscopy due to her comorbidities and has been managed conservatively. Patient states that she began yesterday to have multiple episodes of black stools, and felt like her blood pressure might be low. Patient states that she also began to have some chest pain and shortness of breath. Patient states she at baseline gets some chest discomfort when she exerts herself, and is prescribed NTG at home PRN. Patient was transferred to [facility] for further GI workup. Patient is resting comfortably, still has some abdominal pain and mild chest pressure. Patient's bloodwork is still pending. Patient denies fevers, chills, syncope, N/V, headache, or dizziness.     Chest Pain  - Likely demand ischemia due to anemia and active GI bleeding.   - Patient with known native and bypass graft disease (last cath on record at [facility] 06/19 with mLAD 75% proximal to graft, LIMA-LAD minor luminal irregularities, pLCx 40% ISR, SVG-D1 40% proximal stenosis and 40% distal stenosis).   - Bloodwork pending, continue to trend troponins.   - Will obtain records from [facility] on Monday.   - Restart aspirin and plavix when cleared by GI, consult pending.   - Obtain TTE.   - Continue lipitor.   - If renal function stable, restart losartan.     Melena  - Concern for active GI bleeding.   - GI consult pending.     HTN  - BP well controlled off medications at this time.   - Restart home meds as tolerated.     Assessment and recommendations are final when note is signed by the attending.

## 2021-06-27 LAB
ABO RH CONFIRMATION: SIGNIFICANT CHANGE UP
ANION GAP SERPL CALC-SCNC: 15 MMOL/L — SIGNIFICANT CHANGE UP (ref 5–17)
APPEARANCE UR: CLEAR — SIGNIFICANT CHANGE UP
BACTERIA # UR AUTO: NEGATIVE — SIGNIFICANT CHANGE UP
BILIRUB UR-MCNC: NEGATIVE — SIGNIFICANT CHANGE UP
BUN SERPL-MCNC: 43.3 MG/DL — HIGH (ref 8–20)
CALCIUM SERPL-MCNC: 9.7 MG/DL — SIGNIFICANT CHANGE UP (ref 8.6–10.2)
CHLORIDE SERPL-SCNC: 99 MMOL/L — SIGNIFICANT CHANGE UP (ref 98–107)
CO2 SERPL-SCNC: 21 MMOL/L — LOW (ref 22–29)
COLOR SPEC: YELLOW — SIGNIFICANT CHANGE UP
COVID-19 SPIKE DOMAIN AB INTERP: POSITIVE
COVID-19 SPIKE DOMAIN ANTIBODY RESULT: >250 U/ML — HIGH
CREAT SERPL-MCNC: 1.26 MG/DL — SIGNIFICANT CHANGE UP (ref 0.5–1.3)
DIFF PNL FLD: NEGATIVE — SIGNIFICANT CHANGE UP
EPI CELLS # UR: SIGNIFICANT CHANGE UP
GLUCOSE SERPL-MCNC: 61 MG/DL — LOW (ref 70–99)
GLUCOSE UR QL: NEGATIVE MG/DL — SIGNIFICANT CHANGE UP
HCT VFR BLD CALC: 29.8 % — LOW (ref 34.5–45)
HGB BLD-MCNC: 9.8 G/DL — LOW (ref 11.5–15.5)
KETONES UR-MCNC: ABNORMAL
LEUKOCYTE ESTERASE UR-ACNC: ABNORMAL
LIDOCAIN IGE QN: 36 U/L — SIGNIFICANT CHANGE UP (ref 22–51)
MAGNESIUM SERPL-MCNC: 1.7 MG/DL — SIGNIFICANT CHANGE UP (ref 1.6–2.6)
MCHC RBC-ENTMCNC: 30.8 PG — SIGNIFICANT CHANGE UP (ref 27–34)
MCHC RBC-ENTMCNC: 32.9 GM/DL — SIGNIFICANT CHANGE UP (ref 32–36)
MCV RBC AUTO: 93.7 FL — SIGNIFICANT CHANGE UP (ref 80–100)
NITRITE UR-MCNC: NEGATIVE — SIGNIFICANT CHANGE UP
PH UR: 5 — SIGNIFICANT CHANGE UP (ref 5–8)
PLATELET # BLD AUTO: 175 K/UL — SIGNIFICANT CHANGE UP (ref 150–400)
POTASSIUM SERPL-MCNC: 4.9 MMOL/L — SIGNIFICANT CHANGE UP (ref 3.5–5.3)
POTASSIUM SERPL-SCNC: 4.9 MMOL/L — SIGNIFICANT CHANGE UP (ref 3.5–5.3)
PROT UR-MCNC: NEGATIVE MG/DL — SIGNIFICANT CHANGE UP
RBC # BLD: 3.18 M/UL — LOW (ref 3.8–5.2)
RBC # FLD: 11.8 % — SIGNIFICANT CHANGE UP (ref 10.3–14.5)
RBC CASTS # UR COMP ASSIST: SIGNIFICANT CHANGE UP /HPF (ref 0–4)
SARS-COV-2 IGG+IGM SERPL QL IA: >250 U/ML — HIGH
SARS-COV-2 IGG+IGM SERPL QL IA: POSITIVE
SODIUM SERPL-SCNC: 135 MMOL/L — SIGNIFICANT CHANGE UP (ref 135–145)
SP GR SPEC: 1.01 — SIGNIFICANT CHANGE UP (ref 1.01–1.02)
TROPONIN T SERPL-MCNC: <0.01 NG/ML — SIGNIFICANT CHANGE UP (ref 0–0.06)
UROBILINOGEN FLD QL: NEGATIVE MG/DL — SIGNIFICANT CHANGE UP
WBC # BLD: 8.59 K/UL — SIGNIFICANT CHANGE UP (ref 3.8–10.5)
WBC # FLD AUTO: 8.59 K/UL — SIGNIFICANT CHANGE UP (ref 3.8–10.5)
WBC UR QL: ABNORMAL

## 2021-06-27 PROCEDURE — 93306 TTE W/DOPPLER COMPLETE: CPT | Mod: 26

## 2021-06-27 PROCEDURE — 99233 SBSQ HOSP IP/OBS HIGH 50: CPT

## 2021-06-27 RX ORDER — MAGNESIUM SULFATE 500 MG/ML
1 VIAL (ML) INJECTION ONCE
Refills: 0 | Status: COMPLETED | OUTPATIENT
Start: 2021-06-27 | End: 2021-06-27

## 2021-06-27 RX ORDER — SODIUM CHLORIDE 9 MG/ML
1000 INJECTION, SOLUTION INTRAVENOUS
Refills: 0 | Status: DISCONTINUED | OUTPATIENT
Start: 2021-06-27 | End: 2021-06-27

## 2021-06-27 RX ORDER — SODIUM CHLORIDE 9 MG/ML
1000 INJECTION, SOLUTION INTRAVENOUS
Refills: 0 | Status: DISCONTINUED | OUTPATIENT
Start: 2021-06-27 | End: 2021-06-30

## 2021-06-27 RX ADMIN — PANTOPRAZOLE SODIUM 10 MG/HR: 20 TABLET, DELAYED RELEASE ORAL at 19:33

## 2021-06-27 RX ADMIN — Medication 100 GRAM(S): at 20:47

## 2021-06-27 RX ADMIN — ATORVASTATIN CALCIUM 10 MILLIGRAM(S): 80 TABLET, FILM COATED ORAL at 22:32

## 2021-06-27 NOTE — PROGRESS NOTE ADULT - ASSESSMENT
A/P: 80 y/o F with a PMHx of CAD s/p CABG x 3 (03/2013) and multiple PCI/HOLGER (last 12/03/19), cerebral aneurysm (s/p coiling 05/23/16), carotid stenosis (s/p right carotid endartectomy 03/2013), PVD (s/p multiple iliac stents, HTN, HLD, and IBS who presented to [facility] [place] with complaints of melena, chest pain, and dyspnea. Patient states that she has been having frequent bowel movements for years, and has been following with a GI doctor for IBS. Patient was told that she was high risk for an endoscopy or colonoscopy due to her comorbidities and has been managed conservatively. Patient states that she began yesterday to have multiple episodes of black stools, and felt like her blood pressure might be low. Patient states that she also began to have some chest pain and shortness of breath. Patient states she at baseline gets some chest discomfort when she exerts herself, and is prescribed NTG at home PRN. Patient was transferred to [facility] for further GI workup. Patient is resting comfortably, still has some abdominal pain and mild chest pressure. Patient's bloodwork is still pending. Patient denies fevers, chills, syncope, N/V, headache, or dizziness.     Chest Pain  - Likely demand ischemia due to anemia and active GI bleeding.   - Patient with known native and bypass graft disease (last cath on record at [facility] 06/19 with mLAD 75% proximal to graft, LIMA-LAD minor luminal irregularities, pLCx 40% ISR, SVG-D1 40% proximal stenosis and 40% distal stenosis).   - Troponins negative x 1.   - Will obtain records from [facility] on Monday.   - Restart aspirin and plavix when cleared by GI.  - Obtain TTE.   - Continue lipitor.   - If renal function stable, restart losartan.     Betty-operative Cardiac Risk Stratification   - RCRI 6.6% of major cardiac event.   - Patient with exertional anginal symptoms at this time likely demand ischemia from anemia and active bleeding.   - Troponins negative x 2.   - Echo still pending.   - Due to symptoms patient is at elevated risk of betty-procedural cardiac complications.   - Will try obtain outpatient Cardiology records.     Melena  - Concern for active GI bleeding.   - GI following.    HTN  - BP well controlled off medications at this time.   - Restart home meds as tolerated.     Assessment and recommendations are final when note is signed by the attending.

## 2021-06-27 NOTE — PROGRESS NOTE ADULT - ASSESSMENT
81y/oF PMH CAD s/p CABG x3 (3/2013), multiple PCI/HOLGER (most recently 12/2019), ruptured cerebral aneurysm (s/p coiling 5/2016), carotid stenosis (s/p R CEA 2013), PVD s/p iliac stents, HTN, HLD, poss IBS presenting to [facility] [place] with chest pain, dyspnea and c/o melena. Additionally, reports BP lower than normal at home. Transferred to [facility] for further GI workup.     Melena   -trend H/H   -cont IV PPI   -holding asa, plavix   -GI recs appreciated  -pending cards clearance for EGD Mon  -NPO after midnight     Chest pain  CAD s/p CABG x3, s/p multiple PCI  PVD   HTN  -trops neg   -cardio recs appreciated   -holding asa, plavix   -f/u TTE   -cont lipitor   -pt on HCTZ-losartan outpt, will resume losartan if BP increasing

## 2021-06-27 NOTE — PROGRESS NOTE ADULT - ASSESSMENT
Melena has resolved.  HGB slowly drifting downwards.  Awaiting Cards clearance;  Tentatively planned for EGD tomorrow.    ASA #3.  Seemingly optimized.  Will keep NPO p MN.

## 2021-06-27 NOTE — PROGRESS NOTE ADULT - ATTENDING COMMENTS
80 y/o F admitted with melena and chest pain, likely secondary to demand ischemia in the setting of anemia.   TTE pending  Obtain records of patient's cardiac history/prior workup  Management as above    Thank you for allowing me to participate in the care of this patient  Will continue to follow

## 2021-06-28 ENCOUNTER — TRANSCRIPTION ENCOUNTER (OUTPATIENT)
Age: 81
End: 2021-06-28

## 2021-06-28 LAB
ALBUMIN SERPL ELPH-MCNC: 3 G/DL — LOW (ref 3.3–5.2)
ALP SERPL-CCNC: 38 U/L — LOW (ref 40–120)
ALT FLD-CCNC: 7 U/L — SIGNIFICANT CHANGE UP
ANION GAP SERPL CALC-SCNC: 14 MMOL/L — SIGNIFICANT CHANGE UP (ref 5–17)
AST SERPL-CCNC: 16 U/L — SIGNIFICANT CHANGE UP
BILIRUB SERPL-MCNC: 0.4 MG/DL — SIGNIFICANT CHANGE UP (ref 0.4–2)
BUN SERPL-MCNC: 22.5 MG/DL — HIGH (ref 8–20)
CALCIUM SERPL-MCNC: 8.4 MG/DL — LOW (ref 8.6–10.2)
CHLORIDE SERPL-SCNC: 101 MMOL/L — SIGNIFICANT CHANGE UP (ref 98–107)
CO2 SERPL-SCNC: 20 MMOL/L — LOW (ref 22–29)
CREAT SERPL-MCNC: 0.82 MG/DL — SIGNIFICANT CHANGE UP (ref 0.5–1.3)
FERRITIN SERPL-MCNC: 195 NG/ML — HIGH (ref 15–150)
GLUCOSE SERPL-MCNC: 77 MG/DL — SIGNIFICANT CHANGE UP (ref 70–99)
HCT VFR BLD CALC: 27.7 % — LOW (ref 34.5–45)
HGB BLD-MCNC: 9.2 G/DL — LOW (ref 11.5–15.5)
IRON SATN MFR SERPL: 17 % — SIGNIFICANT CHANGE UP (ref 14–50)
IRON SATN MFR SERPL: 44 UG/DL — SIGNIFICANT CHANGE UP (ref 37–145)
MAGNESIUM SERPL-MCNC: 1.5 MG/DL — LOW (ref 1.6–2.6)
MCHC RBC-ENTMCNC: 30.5 PG — SIGNIFICANT CHANGE UP (ref 27–34)
MCHC RBC-ENTMCNC: 33.2 GM/DL — SIGNIFICANT CHANGE UP (ref 32–36)
MCV RBC AUTO: 91.7 FL — SIGNIFICANT CHANGE UP (ref 80–100)
PLATELET # BLD AUTO: 163 K/UL — SIGNIFICANT CHANGE UP (ref 150–400)
POTASSIUM SERPL-MCNC: 3.8 MMOL/L — SIGNIFICANT CHANGE UP (ref 3.5–5.3)
POTASSIUM SERPL-SCNC: 3.8 MMOL/L — SIGNIFICANT CHANGE UP (ref 3.5–5.3)
PROT SERPL-MCNC: 5.1 G/DL — LOW (ref 6.6–8.7)
RBC # BLD: 3.02 M/UL — LOW (ref 3.8–5.2)
RBC # FLD: 11.6 % — SIGNIFICANT CHANGE UP (ref 10.3–14.5)
SODIUM SERPL-SCNC: 135 MMOL/L — SIGNIFICANT CHANGE UP (ref 135–145)
TIBC SERPL-MCNC: 256 UG/DL — SIGNIFICANT CHANGE UP (ref 220–430)
TRANSFERRIN SERPL-MCNC: 179 MG/DL — LOW (ref 192–382)
WBC # BLD: 7.32 K/UL — SIGNIFICANT CHANGE UP (ref 3.8–10.5)
WBC # FLD AUTO: 7.32 K/UL — SIGNIFICANT CHANGE UP (ref 3.8–10.5)

## 2021-06-28 PROCEDURE — 99233 SBSQ HOSP IP/OBS HIGH 50: CPT

## 2021-06-28 PROCEDURE — 43235 EGD DIAGNOSTIC BRUSH WASH: CPT

## 2021-06-28 RX ORDER — MAGNESIUM SULFATE 500 MG/ML
2 VIAL (ML) INJECTION ONCE
Refills: 0 | Status: COMPLETED | OUTPATIENT
Start: 2021-06-28 | End: 2021-06-28

## 2021-06-28 RX ORDER — SOD SULF/SODIUM/NAHCO3/KCL/PEG
1000 SOLUTION, RECONSTITUTED, ORAL ORAL
Refills: 0 | Status: COMPLETED | OUTPATIENT
Start: 2021-06-28 | End: 2021-06-28

## 2021-06-28 RX ORDER — SOD SULF/SODIUM/NAHCO3/KCL/PEG
2000 SOLUTION, RECONSTITUTED, ORAL ORAL ONCE
Refills: 0 | Status: DISCONTINUED | OUTPATIENT
Start: 2021-06-28 | End: 2021-06-28

## 2021-06-28 RX ADMIN — ATORVASTATIN CALCIUM 10 MILLIGRAM(S): 80 TABLET, FILM COATED ORAL at 21:34

## 2021-06-28 RX ADMIN — PANTOPRAZOLE SODIUM 10 MG/HR: 20 TABLET, DELAYED RELEASE ORAL at 21:35

## 2021-06-28 RX ADMIN — Medication 1000 MILLILITER(S): at 18:40

## 2021-06-28 RX ADMIN — Medication 50 GRAM(S): at 09:05

## 2021-06-28 RX ADMIN — Medication 1000 MILLILITER(S): at 21:34

## 2021-06-28 NOTE — PATIENT PROFILE ADULT - NSPROHMSYMPCOND_GEN_A_NUR
Physician Discharge Summary     Patient ID:  Melissa Pichardo   89 year old    Admit date: 4/17/2017      Discharge date and time: 4/22/2017      Attending Physician: Brian Lopez MD      Discharge Diagnoses: Large bowel obstruction, resolved  Advanced COPD  Unsteady gait requiring a walker  Congestive heart failure, diastolic dysfunction  History of supraventricular tachycardia, resolved  Hypertension  Constipation  History of Crohn's disease, in remission  Coronary artery disease, episode of chest pain, myocardial infarction ruled out  Paroxysmal atrial fibrillation, currently clinically in sinus rhythm  Chronic iron deficiency anemia    Problem List:  Patient Active Problem List   Diagnosis   • Secondary membrane   • Chronic airway obstruction, not elsewhere classified   • Hypertension   • Dry eye syndrome   • Atrial fibrillation with RVR   • Osteoporosis   • Crohn's disease   • Celiac sprue   • COPD (chronic obstructive pulmonary disease)   • PVD (peripheral vascular disease)   • CAD (coronary artery disease)   • Hyperlipidemia   • NSTEMI (non-ST elevated myocardial infarction)   • MRSA (methicillin resistant staph aureus) culture positive   • Left carotid bruit   • Glaucoma suspect   • Systolic hypertension   • Diastolic dysfunction           Hospital Course: Patient was admitted to medical floor. She was kept n.p.o. She was given IV fluids. She has bowel obstruction. CT scan was done. Gen. surgery consult was done. Patient has previous surgeries due to Crohn's and she is at risk of developing bowel obstruction. Conservative medical treatment was recommended. She responded well to conservative treatment. She gradually had improvement in her abdomen pain and she started passing gas. Clear liquids were started and gradually advanced.  Patient has history of atrial fibrillation. She has SCD on the telemetry. She developed episode of chest pain. Serial troponins negative. Her BNP was borderline high. She was  given gentle diuresis with monitoring of electrolytes. EKG did not show any acute ischemic changes. Gradually her symptoms improved. Cardiology consulted. Dr. Swann recommended conservative treatment. Echo of the heart showed normal ejection fraction. No wall motion abnormality.  Once improved, physical therapy was consulted. It was determined that she is at her baseline.  were consulted. Patient lives alone with good social support and she does not need any special needs.  However she is awake. She is at risk of fall. She is unable to go out of home safely due to weakness, advanced age, arthritis, uses a walker, recent hospital admission. She will need monitoring of her cardiac status, monitoring of the blood pressure and pulse as well as abdomen symptoms. I recommended to see an home health services for short time to check on the safety issues as well as medication management and clinical evaluation to see if there is any decompensation of her cardiopulmonary status. She does have advanced COPD as well as coronary artery disease and CHF as well as advanced age. Patient agreed for short-term home health services.  She was advised to follow-up with PCP in about one week time. However patient to suggest that she wanted to follow-up with me and she was looking into switching his PCP to me as an outpatient also. Per patient she has inquired my office and was unable to get appointment couple of times. At patient's request will arrange follow-up with my office.  Education was done about the prevention of constipation with use of dietary changes like prune juice etc. as well as periodic use of MiraLAX powder. She was using Imodium which I recommend that she should never use since Imodium possibly may have contributed to her bowel obstruction situation. Patient agreed.          Consults: Dr. Swann cardiology  Dr. Jesusita Crooks. surgery    Significant Diagnostic Studies: CT scan of the abdomen, chest x-ray, echo of  the heart        Recent Labs  Lab 04/20/17  0340 04/19/17  1032 04/18/17  0522   WBC 5.8 5.3 6.0   HGB 10.1* 11.9* 10.4*   HCT 31.8* 37.9 33.1*    303 261       Recent Labs  Lab 04/20/17  0340   SODIUM 136   POTASSIUM 4.0   CHLORIDE 101   CO2 25   BUN 12   CREATININE 0.93   ALBUMIN 2.8*   BILIRUBIN 0.4   AST 22   GLUCOSE 110*     No results found      Discharge Exam:  Visit Vitals   • /50 (BP Location: [facility], Patient Position: Supine)   • Pulse 61   • Temp 98.2 °F (36.8 °C) (Temporal Artery)   • Resp 18   • Ht 5' 3\" (1.6 m)   • Wt 37.9 kg   • SpO2 93%   • BMI 14.8 kg/m2       General Appearance: Alert, cooperative, no distress, appears stated age    Head: Normocephalic, without obvious abnormality, atraumatic    Eyes: Conjunctiva/corneas clear    Neck: Supple, symmetrical, trachea midline, no adenopathy; thyroid    Lungs: Bilateral generalized reduced air entry without wheezing    Heart: Regular rate and rhythm, S1 and S2 normal, no murmur, No rub or gallop, no S3, PMI not displaced    Abdomen: Soft, non-tender, no lump palpable. Scar of previous surgery within normal limits. No incisional hernia.    Extremities: Extremities normal, atraumatic, no cyanosis or edema    Neurologic: CNII-XII intact. Normal strength, sensation and reflexes   throughout      Disposition: home    Patient Instructions:   Activity: as tolerated  Diet: as tolerated, low-salt, low-cholesterol diet.  Appropriate dietary and discharge instructions were provided. Patient's multiple questions were answered. Medications are done in a separate note.  Follow Up: With Dr. Lopez in 4-5 days per pt request    Time spent in discharge: 35 min    Signed:  Brian Lopez MD  4/22/2017    Home Health Eligibility Summary    I certify this patient under my care is eligible for Home Health Services and I performed a face to face encounter on 4/22/2017    The face to face encounter with the patient was in whole, or in part, for the following  medical condition(s) which is the primary reason for skilled home care:  COPD, Heart Failure and weakness, recent hospital admission for bowel obst..The clinical findings that support the initial home health plan of care include:medication regimen with monitoring needs for signs of decompensation/adverse events and medication management, impaired breathing and requiring teaching of energy conservation techniques/program, impaired mobility required assistive device training and exercise/strengthening program and impaired balance that requires therapeutic program for functional mobility deficits and home safety.     I certify, based on my findings that Skilled Nursing is medically necessary home health services.    The patient is confined to the home due to: arthritis and/or weakness which limits endurance and increases risk for falls, instability with dyspnea and fatigue, severe SOB/SOB upon exertion and inability to safely leave home unassisted.     I have authorized these skilled home health services.  Dr. HYMAN will accept and assume care for this patient and will sign the Home Health Plan of Care.        cardiovascular

## 2021-06-28 NOTE — PROGRESS NOTE ADULT - ASSESSMENT
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 Additionally, reports BP lower than normal at home. Transferred to [facility] for further GI workup.     Melena   -trend H/H   -cont IV PPI   -holding asa, plavix   -GI recs appreciated  -plan for EGD today 6/28    Chest pain  CAD s/p CABG x3, s/p multiple PCI  PVD   HTN  -trops neg   -cardio recs appreciated   -holding asa, plavix   -TTE:    -cont lipitor   -pt on HCTZ-losartan outpt, will resume losartan if BP increasing  81y/oF PMH CAD s/p CABG x3 (3/2013), multiple PCI/HOLGER (most recently 12/2019), ruptured cerebral aneurysm (s/p coiling 5/2016), carotid stenosis (s/p R CEA 2013), PVD s/p iliac stents, HTN, HLD, poss IBS [facility] with chest pain, dyspnea and c/o melena. Additionally, reports BP lower than normal at home. Transferred to [facility] for further GI workup.     Melena   -trend H/H   -cont IV PPI   -holding asa, plavix   -GI recs appreciated  -plan for EGD today 6/28    Chest pain  CAD s/p CABG x3, s/p multiple PCI  PVD   HTN  -trops neg   -cardio recs appreciated   -holding asa, plavix   -TTE:  LVEF: 50-55%, mild LVH, grade I ddx  -cont lipitor   -pt on HCTZ-losartan outpt, will resume losartan if BP increasing  81y/oF PMH CAD s/p CABG x3 (3/2013), multiple PCI/HOLGER (most recently 12/2019), ruptured cerebral aneurysm (s/p coiling 5/2016), carotid stenosis (s/p R CEA 2013), PVD s/p iliac stents, HTN, HLD, poss IBS presenting to [facility] with chest pain, dyspnea and c/o melena. Additionally, reports BP lower than normal at home. Transferred to [facility] for further GI workup.     Melena   -trend H/H   -cont IV PPI   -holding asa, plavix   -GI recs appreciated  -plan for EGD today 6/28    Chest pain  CAD s/p CABG x3, s/p multiple PCI  PVD   HTN  -trops neg   -cardio recs appreciated   -holding asa, plavix   -TTE:  LVEF: 50-55%, mild LVH, grade I ddx  -cont lipitor   -pt on HCTZ-losartan outpt, will resume losartan if BP increasing     Hypomagnesemia   -replace  -cont to monitor

## 2021-06-28 NOTE — PROGRESS NOTE ADULT - ASSESSMENT
A/P: 80 y/o F with a PMHx of CAD s/p CABG x 3 (03/2013) and multiple PCI/HOLGER (last 12/03/19), cerebral aneurysm (s/p coiling 05/23/16), carotid stenosis (s/p right carotid endartectomy 03/2013), PVD (s/p multiple iliac stents, HTN, HLD, and IBS who [facility] with complaints of melena, chest pain, and dyspnea. Patient states that she has been having frequent bowel movements for years, and has been following with a GI doctor for IBS. Patient was told that she was high risk for an endoscopy or colonoscopy due to her comorbidities and has been managed conservatively. Patient states that she began yesterday to have multiple episodes of black stools, and felt like her blood pressure might be low. Patient states that she also began to have some chest pain and shortness of breath. Patient states she at baseline gets some chest discomfort when she exerts herself, and is prescribed NTG at home PRN. Patient was transferred to [facility] for further GI workup. Patient is resting comfortably, still has some abdominal pain and mild chest pressure. Patient's bloodwork is still pending. Patient denies fevers, chills, syncope, N/V, headache, or dizziness.     Chest Pain  - Likely demand ischemia due to anemia and active GI bleeding.   - [facility] 08/2020 with patent grafts and stents, but with known native disease.   - No interventions at this time are indicated.   - Restart aspirin and plavix when cleared by GI.  - Echo with normal EF, no new RWMA, and no significant valvular abnormalities.   - Continue lipitor.   - If renal function stable, restart losartan.     Harleen-operative Cardiac Risk Stratification   - RCRI 6.6% of major cardiac event.   - Patient with exertional anginal symptoms at this time likely demand ischemia from anemia and active bleeding. Resolved.   - Troponins negative x 2.   - Echo with normal EF, no new RWMA, and no significant valvular abnormalities.   - [facility] 08/2020 with patent grafts and stents, but with known native disease.   - No active chest pain, evidence of ischemia, decompensated CHF, significant valvular abnormality, or unstable arrhythmia and therefore no absolute cardiac contraindication to the planned surgical procedure.    Melena  - Concern for active GI bleeding.   - GI following.    HTN  - BP well controlled off medications at this time.   - Restart home meds as tolerated.     Will sign off at this time, please feel free to call with any questions or issues that may arise.   Assessment and recommendations are final when note is signed by the attending.

## 2021-06-28 NOTE — PATIENT PROFILE ADULT - SAFE PLACE TO LIVE
Pre-Surgical Optimization Evaluation    Planned Procedure: Scheduled for Procedure(s):  RIGHT TOTAL KNEE ARTHROPLASTY on 9/6/2018 with  Surgeon(s):  Deric Sanches MDfor M17.11.     Planned Anesthesia: General w/Regional Block    Pre-Op Consultations:  PCP: Preop evaluation completed by Dr. Jorge on 08/31/2018. No further testing indicated.   Cardiology: Preop evaluation complete by Dr. Alatorre on 09/04/2018. No further testing indicated.     Pertinent Medical History:  1. Hx TIAs, remote  2. Hx Aortic dilatation as below  3. Hypertension, taking isosorbide     RCRI score: 1    Estimated body mass index is 25.31 kg/m² as calculated from the following:    Height as of 8/31/18: 5' 10\" (1.778 m).    Weight as of 8/31/18: 80 kg.    Previous Anesthesia History:  Negative for past history of problematic or difficult intubations.  Negative for personal history of prior anesthesia complications or reactions.  Negative for family history of anesthesic reactions.  Negative for personal history of bleeding or clotting disorders.  Negative for family history of bleeding or clotting disorders.    Pre-surgical optimization evaluation complete. Patient is low risk for intermediate risk procedure. Pertinent studies as follows.           8/10/18 NM Stress  1.  Normal myocardial perfusion scans during exercise and at rest.  2.  Normal post-exercise left ventricular systolic function.  3. Cardiac Risk Assessment: Low.   4. As compared to the prior study done on 10/20/2017, no significant change.        8/10/18 Echo  Normal left ventricular size, systolic function and wall thickness except basal septum which is 1.5cm, with no regional wall motion  abnormalities. Left ventricular ejection fraction, 57 %. Grade I/IV diastolic dysfunction.  Mildly increased right ventricular size. Normal right ventricular systolic function Right ventricular systolic pressure 21.2 mmHg.  Mild aortic valve stenosis, mean gradient 8 mmHg, OFE 1.8 cm²  (1.9cm last year). Trileaflet, thickened aortic valve. Mild  calcification. Mild aortic valve regurgitation.  Mild MR. Trace TR. MildPI.  Mildly to moderately dilated ascending aorta dimension: 4.4 cm just above aortic valve, unchanged.    10/14/2014 Cath  -The right coronary artery is dominate vessel originating from right sinus of valsalva. There is a 30-40% stenosis in mid portion. In the distal portion, there is a 30-40% stenosis followed by a 50-60% discrete stenosis. The right PDA and posterolateral branch have no significant disease.  -Left main trunk originating from the left sinus of valsalva, free of obstructive disease  -Left anterior descending had about 20% stenosis in its proximal portion of the vessel, minimal luminal irregularities. Diagonal branch had luminal irregularities   -Left circumflex artery is a codominant vessel with luminal irregularities. The first and second obtuse marginal branch had no significant disease. The third obtuse marginal branch has about 30% stenosis in its midportion. The posterolateral circumflex had luminal irregularities.   Impression:  Moderate to severe disease in the distal right coronary   Normal left ventricular systolic function  Dilated ascending aorta        CXR 08/27/2018  IMPRESSION:  1. No acute cardiopulmonary findings      Most Recent Laboratory Results:  Lab Results   Component Value Date    SODIUM 144 08/27/2018    POTASSIUM 4.4 08/27/2018    BUN 19 08/27/2018    CREATININE 0.85 08/27/2018    GLUCOSE 98 08/27/2018    WBC 7.7 08/27/2018    HGB 14.3 08/27/2018    HCT 41.6 08/27/2018     08/27/2018    INR 1.1 08/27/2018    PTT 29 08/27/2018    PT 11.6 08/27/2018       Past Medical History:      Unspecified essential hypertension                            Other and unspecified hyperlipidemia                          Unspecified asthma(493.90)                                    Osteoarthritis                                                Prostate  Cancer                                 01/01/1994      Comment: s/p prostatectomy and radiation therapy     Hyperparathyroidism (CMS/HCC)                                 Gout                                                          TIA (transient ischemic attack)                               Coronary artery disease                                        Past Surgical History:     REPAIR ING HERNIA,5+Y/O,REDUCIBL                11/08/2010      Comment: Hernia, inguinal    COLONOSCOPY DIAGNOSTIC                          01/01/2009      Comment: Colonoscopy, Dx    ECHO XTHORACIC OLINDA ANOM COMPLETE               05/20/2011    EKG REPORT                                      11/09/2011    RADICAL PROSTATECTOMY CPG                       1994          CATARACT EXTRACTION W/ INTRAOCULAR LENS IMPLANT                 Comment: right    LEFT HEART CATH,PERCUTANEOUS                    10/14/2014      Comment: moderate-to-severe disease in distal RCA;                normal left ventricular systolic function;                dilated ascending aorta    EXCISION OF LINGUAL TONSIL                                    APPENDECTOMY                                                  9/4/2018  Leana Fisher NP   Rafat Sexton no

## 2021-06-28 NOTE — PROGRESS NOTE ADULT - ATTENDING COMMENTS
80 y/o F admitted with melena and chest pain, likely secondary to demand ischemia in the setting of anemia.   TTE showed no significant abnormality  Prior records obtained; patient had [facility] in 8/2020 with patent grafts and stents  Patient does not need any further inpatient ischemic workup at this time  Further management as above    Thank you for allowing me to participate in the care of this patient  Please reconsult as needed

## 2021-06-29 LAB
ANION GAP SERPL CALC-SCNC: 19 MMOL/L — HIGH (ref 5–17)
BUN SERPL-MCNC: 21.5 MG/DL — HIGH (ref 8–20)
CALCIUM SERPL-MCNC: 9 MG/DL — SIGNIFICANT CHANGE UP (ref 8.6–10.2)
CHLORIDE SERPL-SCNC: 106 MMOL/L — SIGNIFICANT CHANGE UP (ref 98–107)
CO2 SERPL-SCNC: 19 MMOL/L — LOW (ref 22–29)
CREAT SERPL-MCNC: 0.92 MG/DL — SIGNIFICANT CHANGE UP (ref 0.5–1.3)
GLUCOSE SERPL-MCNC: 74 MG/DL — SIGNIFICANT CHANGE UP (ref 70–99)
HCT VFR BLD CALC: 30.3 % — LOW (ref 34.5–45)
HGB BLD-MCNC: 10.2 G/DL — LOW (ref 11.5–15.5)
MAGNESIUM SERPL-MCNC: 2.1 MG/DL — SIGNIFICANT CHANGE UP (ref 1.6–2.6)
MCHC RBC-ENTMCNC: 30.9 PG — SIGNIFICANT CHANGE UP (ref 27–34)
MCHC RBC-ENTMCNC: 33.7 GM/DL — SIGNIFICANT CHANGE UP (ref 32–36)
MCV RBC AUTO: 91.8 FL — SIGNIFICANT CHANGE UP (ref 80–100)
PLATELET # BLD AUTO: 206 K/UL — SIGNIFICANT CHANGE UP (ref 150–400)
POTASSIUM SERPL-MCNC: 4.3 MMOL/L — SIGNIFICANT CHANGE UP (ref 3.5–5.3)
POTASSIUM SERPL-SCNC: 4.3 MMOL/L — SIGNIFICANT CHANGE UP (ref 3.5–5.3)
RBC # BLD: 3.3 M/UL — LOW (ref 3.8–5.2)
RBC # FLD: 11.9 % — SIGNIFICANT CHANGE UP (ref 10.3–14.5)
SARS-COV-2 RNA SPEC QL NAA+PROBE: SIGNIFICANT CHANGE UP
SODIUM SERPL-SCNC: 144 MMOL/L — SIGNIFICANT CHANGE UP (ref 135–145)
WBC # BLD: 9.11 K/UL — SIGNIFICANT CHANGE UP (ref 3.8–10.5)
WBC # FLD AUTO: 9.11 K/UL — SIGNIFICANT CHANGE UP (ref 3.8–10.5)

## 2021-06-29 PROCEDURE — 99232 SBSQ HOSP IP/OBS MODERATE 35: CPT

## 2021-06-29 PROCEDURE — 45378 DIAGNOSTIC COLONOSCOPY: CPT

## 2021-06-29 PROCEDURE — 74177 CT ABD & PELVIS W/CONTRAST: CPT | Mod: 26

## 2021-06-29 RX ORDER — SODIUM CHLORIDE 9 MG/ML
1000 INJECTION, SOLUTION INTRAVENOUS
Refills: 0 | Status: DISCONTINUED | OUTPATIENT
Start: 2021-06-29 | End: 2021-06-30

## 2021-06-29 RX ORDER — PANTOPRAZOLE SODIUM 20 MG/1
40 TABLET, DELAYED RELEASE ORAL
Refills: 0 | Status: DISCONTINUED | OUTPATIENT
Start: 2021-06-29 | End: 2021-06-30

## 2021-06-29 RX ADMIN — SODIUM CHLORIDE 60 MILLILITER(S): 9 INJECTION, SOLUTION INTRAVENOUS at 09:26

## 2021-06-29 RX ADMIN — ATORVASTATIN CALCIUM 10 MILLIGRAM(S): 80 TABLET, FILM COATED ORAL at 21:09

## 2021-06-29 RX ADMIN — PANTOPRAZOLE SODIUM 40 MILLIGRAM(S): 20 TABLET, DELAYED RELEASE ORAL at 17:58

## 2021-06-29 NOTE — PROGRESS NOTE ADULT - ASSESSMENT
81y/oF PMH CAD s/p CABG x3 (3/2013), multiple PCI/HOLGER (most recently 12/2019), ruptured cerebral aneurysm (s/p coiling 5/2016), carotid stenosis (s/p R CEA 2013), PVD s/p iliac stents, HTN, HLD, poss IBS presenting to [facility] with chest pain, dyspnea and c/o melena. Additionally, reports BP lower than normal at home. Transferred to [facility] for further GI workup.     Melena   -trend H/H   -PPI BID  -holding asa, plavix   -GI recs appreciated  -s/p EGD 6/28: erosive gastritis, Grade B esophagitis in lower third of the esophagus, normal mucosa in duodenum; esophageal hiatal hernia   -plan for colonoscopy today 6/29    Chest pain  CAD s/p CABG x3, s/p multiple PCI  PVD   HTN  -trops neg   -cardio recs appreciated   -holding asa, plavix   -TTE:  LVEF: 50-55%, mild LVH, grade I ddx  -cont lipitor   -pt on HCTZ-losartan outpt, will resume losartan if BP increasing     Hypomagnesemia   -replace  -cont to monitor

## 2021-06-30 ENCOUNTER — TRANSCRIPTION ENCOUNTER (OUTPATIENT)
Age: 81
End: 2021-06-30

## 2021-06-30 VITALS
TEMPERATURE: 98 F | DIASTOLIC BLOOD PRESSURE: 70 MMHG | HEART RATE: 91 BPM | SYSTOLIC BLOOD PRESSURE: 119 MMHG | OXYGEN SATURATION: 96 %

## 2021-06-30 DIAGNOSIS — K92.1 MELENA: ICD-10-CM

## 2021-06-30 DIAGNOSIS — K57.90 DIVERTICULOSIS OF INTESTINE, PART UNSPECIFIED, WITHOUT PERFORATION OR ABSCESS WITHOUT BLEEDING: ICD-10-CM

## 2021-06-30 LAB
ANION GAP SERPL CALC-SCNC: 13 MMOL/L — SIGNIFICANT CHANGE UP (ref 5–17)
BUN SERPL-MCNC: 12.9 MG/DL — SIGNIFICANT CHANGE UP (ref 8–20)
CALCIUM SERPL-MCNC: 8.4 MG/DL — LOW (ref 8.6–10.2)
CHLORIDE SERPL-SCNC: 103 MMOL/L — SIGNIFICANT CHANGE UP (ref 98–107)
CO2 SERPL-SCNC: 23 MMOL/L — SIGNIFICANT CHANGE UP (ref 22–29)
CREAT SERPL-MCNC: 0.72 MG/DL — SIGNIFICANT CHANGE UP (ref 0.5–1.3)
GLUCOSE SERPL-MCNC: 87 MG/DL — SIGNIFICANT CHANGE UP (ref 70–99)
HCT VFR BLD CALC: 26.3 % — LOW (ref 34.5–45)
HCT VFR BLD CALC: 30.5 % — LOW (ref 34.5–45)
HGB BLD-MCNC: 10.1 G/DL — LOW (ref 11.5–15.5)
HGB BLD-MCNC: 8.9 G/DL — LOW (ref 11.5–15.5)
MAGNESIUM SERPL-MCNC: 1.6 MG/DL — SIGNIFICANT CHANGE UP (ref 1.6–2.6)
MCHC RBC-ENTMCNC: 30.4 PG — SIGNIFICANT CHANGE UP (ref 27–34)
MCHC RBC-ENTMCNC: 30.8 PG — SIGNIFICANT CHANGE UP (ref 27–34)
MCHC RBC-ENTMCNC: 33.1 GM/DL — SIGNIFICANT CHANGE UP (ref 32–36)
MCHC RBC-ENTMCNC: 33.8 GM/DL — SIGNIFICANT CHANGE UP (ref 32–36)
MCV RBC AUTO: 91 FL — SIGNIFICANT CHANGE UP (ref 80–100)
MCV RBC AUTO: 91.9 FL — SIGNIFICANT CHANGE UP (ref 80–100)
PLATELET # BLD AUTO: 183 K/UL — SIGNIFICANT CHANGE UP (ref 150–400)
PLATELET # BLD AUTO: 207 K/UL — SIGNIFICANT CHANGE UP (ref 150–400)
POTASSIUM SERPL-MCNC: 3.7 MMOL/L — SIGNIFICANT CHANGE UP (ref 3.5–5.3)
POTASSIUM SERPL-SCNC: 3.7 MMOL/L — SIGNIFICANT CHANGE UP (ref 3.5–5.3)
RBC # BLD: 2.89 M/UL — LOW (ref 3.8–5.2)
RBC # BLD: 3.32 M/UL — LOW (ref 3.8–5.2)
RBC # FLD: 11.9 % — SIGNIFICANT CHANGE UP (ref 10.3–14.5)
RBC # FLD: 11.9 % — SIGNIFICANT CHANGE UP (ref 10.3–14.5)
SODIUM SERPL-SCNC: 139 MMOL/L — SIGNIFICANT CHANGE UP (ref 135–145)
WBC # BLD: 13.32 K/UL — HIGH (ref 3.8–10.5)
WBC # BLD: 9.53 K/UL — SIGNIFICANT CHANGE UP (ref 3.8–10.5)
WBC # FLD AUTO: 13.32 K/UL — HIGH (ref 3.8–10.5)
WBC # FLD AUTO: 9.53 K/UL — SIGNIFICANT CHANGE UP (ref 3.8–10.5)

## 2021-06-30 PROCEDURE — 86900 BLOOD TYPING SEROLOGIC ABO: CPT

## 2021-06-30 PROCEDURE — 87635 SARS-COV-2 COVID-19 AMP PRB: CPT

## 2021-06-30 PROCEDURE — 83735 ASSAY OF MAGNESIUM: CPT

## 2021-06-30 PROCEDURE — 86850 RBC ANTIBODY SCREEN: CPT

## 2021-06-30 PROCEDURE — 99232 SBSQ HOSP IP/OBS MODERATE 35: CPT

## 2021-06-30 PROCEDURE — 74177 CT ABD & PELVIS W/CONTRAST: CPT

## 2021-06-30 PROCEDURE — 82728 ASSAY OF FERRITIN: CPT

## 2021-06-30 PROCEDURE — 84484 ASSAY OF TROPONIN QUANT: CPT

## 2021-06-30 PROCEDURE — 81001 URINALYSIS AUTO W/SCOPE: CPT

## 2021-06-30 PROCEDURE — 85610 PROTHROMBIN TIME: CPT

## 2021-06-30 PROCEDURE — C8929: CPT

## 2021-06-30 PROCEDURE — 86769 SARS-COV-2 COVID-19 ANTIBODY: CPT

## 2021-06-30 PROCEDURE — 71045 X-RAY EXAM CHEST 1 VIEW: CPT

## 2021-06-30 PROCEDURE — 85730 THROMBOPLASTIN TIME PARTIAL: CPT

## 2021-06-30 PROCEDURE — 36415 COLL VENOUS BLD VENIPUNCTURE: CPT

## 2021-06-30 PROCEDURE — 86901 BLOOD TYPING SEROLOGIC RH(D): CPT

## 2021-06-30 PROCEDURE — 83540 ASSAY OF IRON: CPT

## 2021-06-30 PROCEDURE — 83550 IRON BINDING TEST: CPT

## 2021-06-30 PROCEDURE — 83880 ASSAY OF NATRIURETIC PEPTIDE: CPT

## 2021-06-30 PROCEDURE — 80053 COMPREHEN METABOLIC PANEL: CPT

## 2021-06-30 PROCEDURE — 84466 ASSAY OF TRANSFERRIN: CPT

## 2021-06-30 PROCEDURE — 83690 ASSAY OF LIPASE: CPT

## 2021-06-30 PROCEDURE — 80048 BASIC METABOLIC PNL TOTAL CA: CPT

## 2021-06-30 PROCEDURE — 85025 COMPLETE CBC W/AUTO DIFF WBC: CPT

## 2021-06-30 PROCEDURE — 99239 HOSP IP/OBS DSCHRG MGMT >30: CPT

## 2021-06-30 PROCEDURE — 85027 COMPLETE CBC AUTOMATED: CPT

## 2021-06-30 PROCEDURE — 93005 ELECTROCARDIOGRAM TRACING: CPT

## 2021-06-30 RX ORDER — MAGNESIUM SULFATE 500 MG/ML
1 VIAL (ML) INJECTION ONCE
Refills: 0 | Status: COMPLETED | OUTPATIENT
Start: 2021-06-30 | End: 2021-06-30

## 2021-06-30 RX ORDER — PANTOPRAZOLE SODIUM 20 MG/1
1 TABLET, DELAYED RELEASE ORAL
Qty: 60 | Refills: 0
Start: 2021-06-30 | End: 2021-07-29

## 2021-06-30 RX ADMIN — Medication 100 GRAM(S): at 09:36

## 2021-06-30 RX ADMIN — PANTOPRAZOLE SODIUM 40 MILLIGRAM(S): 20 TABLET, DELAYED RELEASE ORAL at 06:30

## 2021-06-30 NOTE — DISCHARGE NOTE PROVIDER - NSDCMRMEDTOKEN_GEN_ALL_CORE_FT
atorvastatin 10 mg oral tablet: 1 tab(s) orally once a day  calcium (as carbonate) 600 mg oral tablet: 1 tab(s) orally once a day  nitroglycerin 0.4 mg sublingual tablet: 1 tab(s) sublingual every 5 minutes, As Needed  Protonix 40 mg oral delayed release tablet: 1 tab(s) orally 2 times a day  Slow-Mag 71.5 mg-119 mg oral delayed release tablet: 1 tab(s) orally once a day  Vitamin B-12 1000 mcg oral tablet: 1 tab(s) orally once a day  Vitamin C 500 mg oral tablet: 1 tab(s) orally once a day  Vitamin D3 2000 intl units (50 mcg) oral tablet: 1 tab(s) orally once a day

## 2021-06-30 NOTE — DISCHARGE NOTE NURSING/CASE MANAGEMENT/SOCIAL WORK - PATIENT PORTAL LINK FT
You can access the FollowMyHealth Patient Portal offered by [facility] by registering at the following website: [email or web address]. By joining [employer]’s FollowMyHealth portal, you will also be able to view your health information using other applications (apps) compatible with our system.

## 2021-06-30 NOTE — DISCHARGE NOTE PROVIDER - CARE PROVIDERS DIRECT ADDRESSES
[email or web address],DirectAddress_Unknown,DirectAddress_Unknown

## 2021-06-30 NOTE — DISCHARGE NOTE PROVIDER - CARE PROVIDER_API CALL
Aiden Ocampo)  Gastroenterology; Internal Medicine  [address], [address]  [place] [address]  Phone: [phone]  Fax: [phone]  Follow Up Time:     Blaine Zuniga  CARDIOVASCULAR DISEASE  [address] [place] [address]  Phone: [phone]  Fax: [phone]  Follow Up Time: 1-3 days    Tab Hubbard  INTERNAL MEDICINE  [address] [place] [address]  Phone: [phone]  Fax: [phone]  Follow Up Time:

## 2021-06-30 NOTE — DISCHARGE NOTE PROVIDER - PROVIDER TOKENS
PROVIDER:[TOKEN:[[id number]]],PROVIDER:[TOKEN:[[id number]],FOLLOWUP:[1-3 days]],PROVIDER:[TOKEN:[[id number]]]

## 2021-06-30 NOTE — DISCHARGE NOTE PROVIDER - HOSPITAL COURSE
81y/oF PMH CAD s/p CABG x3 (3/2013), multiple PCI/HOLGER (most recently 12/2019), ruptured cerebral aneurysm (s/p coiling 5/2016), carotid stenosis (s/p R CEA 2013), PVD s/p iliac stents, HTN, HLD, poss IBS presenting to [facility] with chest pain, dyspnea and c/o melena. Additionally, reports BP lower than normal at home. Transferred to [facility] for further GI workup. Now s/p EGD 6/28: erosive gastritis, Grade B esophagitis in lower third of the esophagus, normal mucosa in duodenum; esophageal hiatal hernia. S/p colonoscopy 6/29: otherwise normal colon. mild diverticulosis of sigmoid colon. grade/stage III internal hemorrhoids. Also had CT enterography with normal small bowel. 81y/oF PMH CAD s/p CABG x3 (3/2013), multiple PCI/HOLGER (most recently 12/2019), ruptured cerebral aneurysm (s/p coiling 5/2016), carotid stenosis (s/p R CEA 2013), PVD s/p iliac stents, HTN, HLD, poss IBS presenting to [facility] with chest pain, dyspnea and c/o melena. Additionally, reports BP lower than normal at home. Transferred to [facility] for further GI workup. Now s/p EGD 6/28: erosive gastritis, Grade B esophagitis in lower third of the esophagus, normal mucosa in duodenum; esophageal hiatal hernia. S/p colonoscopy 6/29: otherwise normal colon. mild diverticulosis of sigmoid colon. grade/stage III internal hemorrhoids. Also had CT enterography with normal small bowel.     Vital Signs Last 24 Hrs  T(C): 36.5 (30 Jun 2021 12:48), Max: 36.8 (30 Jun 2021 04:25)  T(F): 97.7 (30 Jun 2021 12:48), Max: 98.3 (30 Jun 2021 04:25)  HR: 98 (30 Jun 2021 12:48) (76 - 98)  BP: 95/65 (30 Jun 2021 12:48) (95/65 - 126/71)  BP(mean): --  RR: 18 (30 Jun 2021 12:48) (18 - 18)  SpO2: 90% (30 Jun 2021 12:48) (90% - 97%)    GENERAL: NAD  HEENT: PERRL, +EOMI  NECK: soft, supple  CHEST/LUNG: Clear to auscultation bilaterally; No wheezing  HEART: S1S2+, Regular rate and rhythm; +III/VI systolic murmur  ABDOMEN: Soft, Nontender, Nondistended; Bowel sounds present  SKIN: warm, dry  NEURO: AAOX3, no focal deficits  PSYCH: normal affect     33minutes spent on discharge

## 2021-06-30 NOTE — PROGRESS NOTE ADULT - SUBJECTIVE AND OBJECTIVE BOX
[place] [facility]                                                               Office: [address] [address]                                                              Telephone: [phone]. Fax:[phone]                                                                             PROGRESS NOTE  Reason for follow up: Chest Pain  Update: Obtained patient's records from [facility]. Patient had her last C 08/2020 with patent CABG grafts and no significant ISR. Patient states she has had no further episodes of chest pain. Echocardiogram shows normal EF, and no new RWMA. Plan for EGD.     Review of symptoms:   Cardiac:  No chest pain. No dyspnea. No palpitations.  Respiratory: No cough. No dyspnea  Gastrointestinal: No diarrhea. No abdominal pain. No bleeding.     Past medical history: No updates.   	  Vitals:  T(C): 36.5 (06-28-21 @ 04:43), Max: 36.8 (06-27-21 @ 22:23)  HR: 60 (06-28-21 @ 04:43) (60 - 72)  BP: 108/54 (06-28-21 @ 04:43) (108/54 - 117/65)  RR: 18 (06-28-21 @ 04:43) (18 - 18)  SpO2: 94% (06-28-21 @ 04:43) (94% - 96%)  Wt(kg): --  I&O's Summary    27 Jun 2021 07:01  -  28 Jun 2021 07:00  --------------------------------------------------------  IN: 680 mL / OUT: 1200 mL / NET: -520 mL      Weight (kg): 52.2 (06-26 @ 11:54)      PHYSICAL EXAM:  Constitutional: Comfortable . No acute distress.   HEENT: Atraumatic and normocephalic , neck is supple . no JVD. No carotid bruit. PEERL   CNS: A&Ox3. No focal deficits. EOMI. Cranial nerves II-IX are intact.   Lymph Nodes: Cervical : Not palpable.  Respiratory: CTAB  Cardiovascular: S1S2 RRR. No rubs or gallop. III/VI systolic murmur lsb  Gastrointestinal: Soft non-tender and non distended . +Bowel sounds. negative Enamorado's sign.  Extremities: No edema.   Psychiatric: Calm . no agitation.  Skin: No skin rash/ulcers visualized to face, hands or feet.        CURRENT MEDICATIONS:    pantoprazole Infusion  atorvastatin  lactated ringers.      DIAGNOSTIC TESTING:  [ ] Echocardiogram:   < from: TTE Echo Complete w/ Contrast w/ Doppler (06.27.21 @ 15:37) >  PHYSICIAN INTERPRETATION:  Left Ventricle: The left ventricular internal cavity size is normal. There is mild left ventricular hypertrophy involving the septal wall. The LVH involves septal walls.  Global LV systolic function was normal. Left ventricular ejection fraction, by visual estimation, is 50 to 55%. Normal segmental left ventricular systolic function. Spectral Doppler shows impaired relaxation pattern of left ventricular myocardial filling (Grade I diastolic dysfunction). Normal LV filling pressures.  Right Ventricle: The right ventricular size is normal. RV systolic function is normal. TV S' 0.1 m/s.  Left Atrium: Normal left atrial size.  Right Atrium: Normal right atrial size.  Pericardium: There is no evidence of pericardial effusion.  Mitral Valve: The mitral valve is normal in structure. Mild thickening of the anterior mitral valve leaflet. Mitral leaflet mobility is normal. There is mild mitral annular calcification. No evidence of mitral stenosis. Trace mitral valve regurgitation is seen.  Tricuspid Valve: Trivial tricuspid regurgitation is visualized. Insufficient TR to accurately assess RVSP.  Aortic Valve: The aortic valve is trileaflet. No evidence of aortic stenosis. Sclerotic aortic valve with normal opening. No evidence of aortic valve regurgitation is seen.  Pulmonic Valve: The pulmonic valve is normal. Trace pulmonic valve regurgitation.  Aorta: The aortic root and ascending aorta are structurally normal, with no evidence of dilitation.  Pulmonary Artery: The pulmonary artery is of normal size and origin.  Venous: The inferior vena cava is normal. The inferior vena cava was normal sized, with respiratory size variation greater than 50%.  In comparison to the previous echocardiogram(s): There are no prior studies on this patient for comparison purposes. No prior studies are available for comparison.      Summary:   1. Left ventricular ejection fraction, by visual estimation, is 50 to 55%. LV Ejection Fraction by Coats's Method with a biplane EF of 53 %.   2. Normal global left ventricular systolic function.   3. There is mild left ventricular hypertrophy.   4. Spectral Doppler shows impaired relaxation pattern of left ventricular myocardial filling (Grade I diastolic dysfunction).   5. Normal left atrial size.   6. Normal right atrial size.   7. Mild mitral annular calcification.   8. Mild thickening of the anterior mitral valve leaflet.   9. Trace mitral valve regurgitation.  10. Insufficient TR to accurately assess RVSP.  11. Sclerotic aortic valve with normal opening.  12. There is no evidence of pericardial effusion.  13. No prior studies are available for comparison.    MARIA G Corbett Electronically signed on 6/27/2021 at 6:51:24 PM    < end of copied text >    [ ]  Catheterization:  [facility] 08/06/2020  LM: normal  pLAD 100%  Cx normal  % RCA    LIMA-LAD patent.   SVG to Diag 1 patent     OTHER: 	      LABS:	 	  CARDIAC MARKERS ( 27 Jun 2021 09:05 )  x     / <0.01 ng/mL / x     / x     / x      p-BNP 27 Jun 2021 09:05: x    , CARDIAC MARKERS ( 26 Jun 2021 22:05 )  x     / <0.01 ng/mL / x     / x     / x      p-BNP 26 Jun 2021 22:05: x    , CARDIAC MARKERS ( 26 Jun 2021 15:13 )  x     / <0.01 ng/mL / x     / x     / x      p-BNP 26 Jun 2021 15:13: 342 pg/mL                          9.2    7.32  )-----------( 163      ( 28 Jun 2021 07:29 )             27.7     06-28    135  |  101  |  22.5<H>  ----------------------------<  77  3.8   |  20.0<L>  |  0.82    Ca    8.4<L>      28 Jun 2021 07:29  Mg     1.5     06-28    TPro  5.1<L>  /  Alb  3.0<L>  /  TBili  0.4  /  DBili  x   /  AST  16  /  ALT  7   /  AlkPhos  38<L>  06-28    proBNP: Serum Pro-Brain Natriuretic Peptide: 342 pg/mL (06-26 @ 15:13)    Lipid Profile:   HgA1c:   TSH:       TELEMETRY: Reviewed  SR 1st degree, 1 episode of Wenckebach         
                                                               [place] [facility]                                                               Office: [address] [address]                                                              Telephone: [phone]. Fax:[phone]                                                                             PROGRESS NOTE  Reason for follow up: Chest Pain  Update: Patient's troponins negative x 2 overnight, but patient states that she is feeling very weak. When patient ambulated the short distance to the bathroom she states she had some chest pressure and shortness of breath that resolved when she rested. Patient is nervous about the Endoscopy, and states she wants to consider transferring to [place] where she had all of her previous CABG/PCI performed. No active chest pain at this time.     Review of symptoms:   Cardiac:  + chest pain. + dyspnea. No palpitations.  Respiratory: No cough. No dyspnea  Gastrointestinal: No diarrhea. No abdominal pain. No bleeding.     Past medical history: No updates.   	  Vitals:  T(C): 36.4 (06-27-21 @ 05:35), Max: 37.1 (06-26-21 @ 19:25)  HR: 88 (06-27-21 @ 05:35) (80 - 104)  BP: 112/55 (06-27-21 @ 05:35) (99/63 - 114/68)  RR: 18 (06-27-21 @ 05:35) (18 - 20)  SpO2: 97% (06-27-21 @ 05:35) (97% - 97%)  Wt(kg): --  I&O's Summary    26 Jun 2021 07:01  -  27 Jun 2021 07:00  --------------------------------------------------------  IN: 110 mL / OUT: 450 mL / NET: -340 mL      Weight (kg): 52.2 (06-26 @ 11:54)      PHYSICAL EXAM:  Constitutional: Comfortable . No acute distress.   HEENT: Atraumatic and normocephalic , neck is supple . no JVD. No carotid bruit. PEERL   CNS: A&Ox3. No focal deficits. EOMI. Cranial nerves II-IX are intact.   Lymph Nodes: Cervical : Not palpable.  Respiratory: CTAB  Cardiovascular: S1S2 RRR. No rubs or gallop. III/VI systolic murmur lsb  Gastrointestinal: Soft non-tender and non distended . +Bowel sounds. negative Enamorado's sign.  Extremities: No edema.   Psychiatric: Calm . no agitation.  Skin: No skin rash/ulcers visualized to face, hands or feet.      CURRENT MEDICATIONS:    pantoprazole Infusion  atorvastatin  lactated ringers.      DIAGNOSTIC TESTING:  [ ] Echocardiogram: Pending    OTHER: 	      LABS:	 	  CARDIAC MARKERS ( 26 Jun 2021 22:05 )  x     / <0.01 ng/mL / x     / x     / x      p-BNP 26 Jun 2021 22:05: x    , CARDIAC MARKERS ( 26 Jun 2021 15:13 )  x     / <0.01 ng/mL / x     / x     / x      p-BNP 26 Jun 2021 15:13: 342 pg/mL                          9.9    12.06 )-----------( 186      ( 26 Jun 2021 22:05 )             29.9     06-26    135  |  96<L>  |  43.4<H>  ----------------------------<  85  4.0   |  26.0  |  0.98    Ca    10.1      26 Jun 2021 15:13    TPro  6.8  /  Alb  4.3  /  TBili  0.6  /  DBili  x   /  AST  15  /  ALT  8   /  AlkPhos  52  06-26    proBNP: Serum Pro-Brain Natriuretic Peptide: 342 pg/mL (06-26 @ 15:13)    Lipid Profile:   HgA1c:   TSH:       TELEMETRY: Reviewed  SR, PACs, PVCs,   ECG:  Reviewed by me. 	      
OLU ERNA    [id number]    81y      Female    CC: melena     INTERVAL HPI/OVERNIGHT EVENTS: pt seen and examined. c/o general fatigue. s/p tte. npo for egd     REVIEW OF SYSTEMS:    CONSTITUTIONAL: No fever, weight loss  RESPIRATORY: No cough, wheezing, hemoptysis; No shortness of breath  CARDIOVASCULAR: No chest pain, palpitations  GASTROINTESTINAL: No abdominal or epigastric pain. No nausea, vomiting  NEUROLOGICAL: No headaches, memory loss    Vital Signs Last 24 Hrs  T(C): 36.5 (2021 13:02), Max: 36.8 (2021 22:23)  T(F): 97.7 (2021 13:02), Max: 98.2 (2021 22:23)  HR: 70 (2021 13:02) (60 - 72)  BP: 149/72 (2021 13:02) (108/54 - 149/72)  BP(mean): --  RR: 18 (2021 13:02) (18 - 18)  SpO2: 92% (2021 13:02) (92% - 96%)    PHYSICAL EXAM:    GENERAL: NAD  HEENT: PERRL, +EOMI  NECK: soft, supple  CHEST/LUNG: Clear to auscultation bilaterally; No wheezing  HEART: S1S2+, Regular rate and rhythm; +III/VI systolic murmur  ABDOMEN: Soft, Nontender, Nondistended; Bowel sounds present  SKIN: warm, dry  NEURO: AAOX3, no focal deficits  PSYCH: normal affect     LABS:                        9.2    7.32  )-----------( 163      ( 2021 07:29 )             27.7         135  |  101  |  22.5<H>  ----------------------------<  77  3.8   |  20.0<L>  |  0.82    Ca    8.4<L>      2021 07:29  Mg     1.5         TPro  5.1<L>  /  Alb  3.0<L>  /  TBili  0.4  /  DBili  x   /  AST  16  /  ALT  7   /  AlkPhos  38<L>      PT/INR - ( 2021 15:13 )   PT: 13.4 sec;   INR: 1.16 ratio         PTT - ( 2021 15:13 )  PTT:32.7 sec  Urinalysis Basic - ( 2021 01:20 )    Color: Yellow / Appearance: Clear / S.015 / pH: x  Gluc: x / Ketone: Trace  / Bili: Negative / Urobili: Negative mg/dL   Blood: x / Protein: Negative mg/dL / Nitrite: Negative   Leuk Esterase: Trace / RBC: 0-2 /HPF / WBC 6-10   Sq Epi: x / Non Sq Epi: Occasional / Bacteria: Negative          MEDICATIONS  (STANDING):  atorvastatin 10 milliGRAM(s) Oral at bedtime  lactated ringers. 1000 milliLiter(s) (75 mL/Hr) IV Continuous <Continuous>  pantoprazole Infusion 8 mG/Hr (10 mL/Hr) IV Continuous <Continuous>    MEDICATIONS  (PRN):  ondansetron Injectable 4 milliGRAM(s) IV Push every 8 hours PRN Nausea and/or Vomiting      RADIOLOGY & ADDITIONAL TESTS:    < from: TTE Echo Complete w/ Contrast w/ Doppler (21 @ 15:37) >  Summary:   1. Left ventricular ejection fraction, by visual estimation, is 50 to 55%. LV Ejection Fraction by Coats's Method with a biplane EF of 53 %.   2. Normal global left ventricular systolic function.   3. There is mild left ventricular hypertrophy.   4. Spectral Doppler shows impaired relaxation pattern of left ventricular myocardial filling (Grade I diastolic dysfunction).   5. Normal left atrial size.   6. Normal right atrial size.   7. Mild mitral annular calcification.   8. Mild thickening of the anterior mitral valve leaflet.   9. Trace mitral valve regurgitation.  10. Insufficient TR to accurately assess RVSP.  11. Sclerotic aortic valve with normal opening.  12. There is no evidence of pericardial effusion.  13. No prior studies are available for comparison.    MARIA G Corbett Electronically signed on 2021 at 6:51:24 PM    < end of copied text >  
OLU UMANZOR    [id number]    81y      Female    CC: melena     INTERVAL HPI/OVERNIGHT EVENTS: Pt seen and examined. c/o fatigue, some midsternal and epigastric "burning" pain. denies vomiting, no BM since arrival     REVIEW OF SYSTEMS:    CONSTITUTIONAL: No fever, weight loss  RESPIRATORY: No cough, wheezing, hemoptysis; No shortness of breath  CARDIOVASCULAR: No chest pain, palpitations  GASTROINTESTINAL:  No vomiting  NEUROLOGICAL: No headaches    Vital Signs Last 24 Hrs  T(C): 36.4 (2021 05:35), Max: 37.1 (2021 19:25)  T(F): 97.5 (2021 05:35), Max: 98.7 (2021 19:25)  HR: 88 (2021 05:35) (80 - 104)  BP: 112/55 (2021 05:35) (99/63 - 114/68)  BP(mean): --  RR: 18 (2021 05:35) (18 - 20)  SpO2: 97% (2021 05:35) (97% - 97%)    PHYSICAL EXAM:    GENERAL: NAD  HEENT: PERRL, +EOMI  NECK: soft, supple  CHEST/LUNG: Clear to auscultation bilaterally; No wheezing  HEART: S1S2+, Regular rate and rhythm; +III/VI systolic murmur   ABDOMEN: Soft, Nondistended; Bowel sounds present; mildly tender to epigastrium  SKIN: warm, dry  NEURO: AAOX3, no focal deficits  PSYCH: normal affect     LABS:                        9.8    8.59  )-----------( 175      ( 2021 09:05 )             29.8         135  |  99  |  43.3<H>  ----------------------------<  61<L>  4.9   |  21.0<L>  |  1.26    Ca    9.7      2021 09:05  Mg     1.7         TPro  6.8  /  Alb  4.3  /  TBili  0.6  /  DBili  x   /  AST  15  /  ALT  8   /  AlkPhos  52      PT/INR - ( 2021 15:13 )   PT: 13.4 sec;   INR: 1.16 ratio         PTT - ( 2021 15:13 )  PTT:32.7 sec  Urinalysis Basic - ( 2021 01:20 )    Color: Yellow / Appearance: Clear / S.015 / pH: x  Gluc: x / Ketone: Trace  / Bili: Negative / Urobili: Negative mg/dL   Blood: x / Protein: Negative mg/dL / Nitrite: Negative   Leuk Esterase: Trace / RBC: 0-2 /HPF / WBC 6-10   Sq Epi: x / Non Sq Epi: Occasional / Bacteria: Negative          MEDICATIONS  (STANDING):  atorvastatin 10 milliGRAM(s) Oral at bedtime  lactated ringers. 1000 milliLiter(s) (75 mL/Hr) IV Continuous <Continuous>  magnesium sulfate  IVPB 1 Gram(s) IV Intermittent once  pantoprazole Infusion 8 mG/Hr (10 mL/Hr) IV Continuous <Continuous>    MEDICATIONS  (PRN):  ondansetron Injectable 4 milliGRAM(s) IV Push every 8 hours PRN Nausea and/or Vomiting      RADIOLOGY & ADDITIONAL TESTS:  
Patient seen and examined; chart reviewed.  Recently notes no further bleeding.  No BM today.  No N/v.  Tolerates CLD.    Had Echo today.  Not yet cardiac cleared.  Still with some low grade symptoms.  Comfortable at rest.  Hgb has drifted down to 9.8.  BUN is unchanged at 44.  Cr normal at 1.3.  PPI drip in place.      PAST MEDICAL & SURGICAL HISTORY:  HTN (hypertension)    HLD (hyperlipidemia)    PVD (peripheral vascular disease)    SDH (subdural hematoma)    Coronary artery graft present    Cholelithiasis    S/P CABG x 3  2013    History of right-sided carotid endarterectomy  2013    History of surgery for cerebral aneurysm  s/p coiling of ruptured aneurysm, 2016    S/P coronary artery stent placement  x2 (2018), x1 (2018), x2 (), x1 ()        ROS:  No Heartburn, regurgitation, dysphagia, odynophagia.  No dyspepsia  No abdominal pain.    No Nausea, vomiting.  No Bleeding.  No hematemesis.   No diarrhea.    No hematochesia.  No weight loss, anorexia.  No edema.      MEDICATIONS  (STANDING):  atorvastatin 10 milliGRAM(s) Oral at bedtime  lactated ringers. 1000 milliLiter(s) (75 mL/Hr) IV Continuous <Continuous>  magnesium sulfate  IVPB 1 Gram(s) IV Intermittent once  pantoprazole Infusion 8 mG/Hr (10 mL/Hr) IV Continuous <Continuous>    MEDICATIONS  (PRN):  ondansetron Injectable 4 milliGRAM(s) IV Push every 8 hours PRN Nausea and/or Vomiting      Allergies    No Known Allergies    Intolerances        Vital Signs Last 24 Hrs  T(C): 36.6 (2021 16:55), Max: 37.1 (2021 19:25)  T(F): 97.8 (2021 16:55), Max: 98.7 (2021 19:25)  HR: 72 (2021 16:55) (72 - 104)  BP: 117/65 (2021 16:55) (105/68 - 117/65)  BP(mean): --  RR: 18 (2021 16:55) (18 - 18)  SpO2: 96% (2021 16:55) (96% - 97%)    PHYSICAL EXAM:    GENERAL: NAD, well-groomed, well-developed  HEAD:  Atraumatic, Normocephalic  EYES: EOMI, PERRLA, conjunctiva and sclera clear  ENMT: No tonsillar erythema, exudates, or enlargement; Moist mucous membranes, Good dentition, No lesions  NECK: Supple, No JVD, Normal thyroid  CHEST/LUNG: Clear to percussion bilaterally; No rales, rhonchi, wheezing, or rubs  HEART: Regular rate and rhythm; No murmurs, rubs, or gallops  ABDOMEN: Soft, Nontender, Nondistended; Bowel sounds present  EXTREMITIES:  2+ Peripheral Pulses, No clubbing, cyanosis, or edema  LYMPH: No lymphadenopathy noted  SKIN: No rashes or lesions      LABS:                        9.8    8.59  )-----------( 175      ( 2021 09:05 )             29.8         135  |  99  |  43.3<H>  ----------------------------<  61<L>  4.9   |  21.0<L>  |  1.26    Ca    9.7      2021 09:05  Mg     1.7         TPro  6.8  /  Alb  4.3  /  TBili  0.6  /  DBili  x   /  AST  15  /  ALT  8   /  AlkPhos  52      PT/INR - ( 2021 15:13 )   PT: 13.4 sec;   INR: 1.16 ratio         PTT - ( 2021 15:13 )  PTT:32.7 sec   Urinalysis Basic - ( 2021 01:20 )    Color: Yellow / Appearance: Clear / S.015 / pH: x  Gluc: x / Ketone: Trace  / Bili: Negative / Urobili: Negative mg/dL   Blood: x / Protein: Negative mg/dL / Nitrite: Negative   Leuk Esterase: Trace / RBC: 0-2 /HPF / WBC 6-10   Sq Epi: x / Non Sq Epi: Occasional / Bacteria: Negative          RADIOLOGY & ADDITIONAL STUDIES:
OLU UMANZOR    [id number]    81y      Female    CC: melena     INTERVAL HPI/OVERNIGHT EVENTS: pt seen and examined. planned for colonoscopy today. reports "all black" stool o/n     REVIEW OF SYSTEMS:    CONSTITUTIONAL: No fever, weight loss  RESPIRATORY: No cough, wheezing, hemoptysis; No shortness of breath  CARDIOVASCULAR: No chest pain, palpitations  GASTROINTESTINAL: No abdominal or epigastric pain. No nausea, vomiting  NEUROLOGICAL: No headaches    Vital Signs Last 24 Hrs  T(C): 36.6 (29 Jun 2021 09:44), Max: 36.6 (29 Jun 2021 05:31)  T(F): 97.9 (29 Jun 2021 09:44), Max: 97.9 (29 Jun 2021 09:44)  HR: 93 (29 Jun 2021 09:44) (70 - 98)  BP: 146/78 (29 Jun 2021 09:44) (109/64 - 149/72)  BP(mean): --  RR: 18 (29 Jun 2021 09:44) (18 - 18)  SpO2: 92% (29 Jun 2021 05:31) (92% - 95%)    PHYSICAL EXAM:    GENERAL: NAD  HEENT: PERRL, +EOMI  NECK: soft, supple  CHEST/LUNG: Clear to auscultation bilaterally; No wheezing  HEART: S1S2+, Regular rate and rhythm; +III/VI systolic murmur  ABDOMEN: Soft, Nontender, Nondistended; Bowel sounds present  SKIN: warm, dry  NEURO: AAOX3, no focal deficits  PSYCH: normal affect     LABS:                        10.2   9.11  )-----------( 206      ( 29 Jun 2021 06:57 )             30.3     06-29    144  |  106  |  21.5<H>  ----------------------------<  74  4.3   |  19.0<L>  |  0.92    Ca    9.0      29 Jun 2021 06:57  Mg     2.1     06-29    TPro  5.1<L>  /  Alb  3.0<L>  /  TBili  0.4  /  DBili  x   /  AST  16  /  ALT  7   /  AlkPhos  38<L>  06-28            MEDICATIONS  (STANDING):  atorvastatin 10 milliGRAM(s) Oral at bedtime  dextrose 5% + sodium chloride 0.45% 1000 milliLiter(s) (60 mL/Hr) IV Continuous <Continuous>  lactated ringers. 1000 milliLiter(s) (75 mL/Hr) IV Continuous <Continuous>  pantoprazole Infusion 8 mG/Hr (10 mL/Hr) IV Continuous <Continuous>    MEDICATIONS  (PRN):  ondansetron Injectable 4 milliGRAM(s) IV Push every 8 hours PRN Nausea and/or Vomiting      RADIOLOGY & ADDITIONAL TESTS:    < from: EGD (06.28.21 @ 00:00) >   Erosions in the antrum and stomach body compatible with erosive gastritis.      Normal mucosa in the whole examined duodenum.      Grade B esophagitis in the lower third of the esophagus.      Esophageal hiatal hernia.      Otherwise normal esophagus.     < end of copied text >  
Patient is a 81y old  Female who presents with a chief complaint of melena; ABLA. (27 Jun 2021 18:10)      HPI:  81y/oF PMH CAD s/p CABG x3 (3/2013), multiple PCI/HOLGER (most recently 12/2019), ruptured cerebral aneurysm (s/p coiling 5/2016), carotid stenosis (s/p R CEA 2013), PVD s/p iliac stents, HTN, HLD,  poss IBS      Patient seen around 2:30 ( prior to discharge ). Hgb 10.3. No rectal bleeding or melena. No abdominal pain. tolerating solid diet. No fevers. CTE negative . EGD with esophagitis and gastritis. Colon with diverticulosis and hemorrhoids       REVIEW OF SYSTEMS:  Constitutional: No fever, weight loss or fatigue  ENMT:  No difficulty hearing, tinnitus, vertigo; No sinus or throat pain  Respiratory: No cough, wheezing, chills or hemoptysis  Cardiovascular: No chest pain, palpitations, dizziness or leg swelling  Gastrointestinal: No abdominal or epigastric pain. No nausea, vomiting or hematemesis; No diarrhea or constipation. No melena or hematochezia.  Skin: No itching, burning, rashes or lesions   Musculoskeletal: No joint pain or swelling; No muscle, back or extremity pain    PAST MEDICAL & SURGICAL HISTORY:  HTN (hypertension)    HLD (hyperlipidemia)    PVD (peripheral vascular disease)    SDH (subdural hematoma)    Coronary artery graft present    Cholelithiasis    S/P CABG x 3  2013    History of right-sided carotid endarterectomy  2013    History of surgery for cerebral aneurysm  s/p coiling of ruptured aneurysm, 2016    S/P coronary artery stent placement  x2 (2/2018), x1 (12/2018), x2 (2019), x1 (2019)        FAMILY HISTORY:  No pertinent family history in first degree relatives        SOCIAL HISTORY:  Smoking Status: [ ] Current, [ ] Former, [ ] Never  Pack Years:  [  ] EtOH-no  [  ] IVDA    MEDICATIONS:  MEDICATIONS  (STANDING):  atorvastatin 10 milliGRAM(s) Oral at bedtime  pantoprazole    Tablet 40 milliGRAM(s) Oral two times a day    MEDICATIONS  (PRN):  ondansetron Injectable 4 milliGRAM(s) IV Push every 8 hours PRN Nausea and/or Vomiting      Allergies    No Known Allergies    Intolerances        Vital Signs Last 24 Hrs  T(C): 36.7 (30 Jun 2021 16:19), Max: 36.8 (30 Jun 2021 04:25)  T(F): 98.1 (30 Jun 2021 16:19), Max: 98.3 (30 Jun 2021 04:25)  HR: 91 (30 Jun 2021 16:19) (76 - 98)  BP: 119/70 (30 Jun 2021 16:19) (95/65 - 126/71)  BP(mean): --  RR: 18 (30 Jun 2021 12:48) (18 - 18)  SpO2: 96% (30 Jun 2021 16:19) (90% - 97%)    06-29 @ 07:01  -  06-30 @ 07:00  --------------------------------------------------------  IN: 0 mL / OUT: 300 mL / NET: -300 mL          PHYSICAL EXAM:    General: elderly ; well nourished; in no acute distress  HEENT: MMM, conjunctiva and sclera clear  H- RRR  L- CTA   Gastrointestinal: Soft, non-tender non-distended; Normal bowel sounds; No rebound or guarding  Extremities: Normal range of motion, No clubbing, cyanosis or edema  Neurological: Alert and oriented x3  Skin: Warm and dry. No obvious rash      LABS:                        10.1   13.32 )-----------( 207      ( 30 Jun 2021 12:08 )             30.5     30 Jun 2021 06:51    139    |  103    |  12.9   ----------------------------<  87     3.7     |  23.0   |  0.72     Ca    8.4        30 Jun 2021 06:51  Mg     1.6       30 Jun 2021 06:51                RADIOLOGY & ADDITIONAL STUDIES:     < from: CT Enterography w/ Oral Cont and w/ IV Cont (06.29.21 @ 20:29) >      < end of copied text >  < from: CT Enterography w/ Oral Cont and w/ IV Cont (06.29.21 @ 20:29) >  IMPRESSION:  *  Normal CT enterography with normal small bowel      < end of copied text >

## 2021-07-02 ENCOUNTER — TRANSCRIPTION ENCOUNTER (OUTPATIENT)
Age: 81
End: 2021-07-02

## 2021-07-02 ENCOUNTER — OUTPATIENT (OUTPATIENT)
Dept: OUTPATIENT SERVICES | Facility: HOSPITAL | Age: 81
LOS: 1 days | End: 2021-07-02

## 2021-07-02 DIAGNOSIS — Z95.1 PRESENCE OF AORTOCORONARY BYPASS GRAFT: Chronic | ICD-10-CM

## 2021-07-02 DIAGNOSIS — Z98.890 OTHER SPECIFIED POSTPROCEDURAL STATES: Chronic | ICD-10-CM

## 2021-07-02 DIAGNOSIS — Z95.5 PRESENCE OF CORONARY ANGIOPLASTY IMPLANT AND GRAFT: Chronic | ICD-10-CM

## 2021-07-06 ENCOUNTER — OUTPATIENT (OUTPATIENT)
Dept: OUTPATIENT SERVICES | Facility: HOSPITAL | Age: 81
LOS: 1 days | End: 2021-07-06

## 2021-07-06 DIAGNOSIS — Z95.5 PRESENCE OF CORONARY ANGIOPLASTY IMPLANT AND GRAFT: Chronic | ICD-10-CM

## 2021-07-06 DIAGNOSIS — Z98.890 OTHER SPECIFIED POSTPROCEDURAL STATES: Chronic | ICD-10-CM

## 2021-07-06 DIAGNOSIS — Z95.1 PRESENCE OF AORTOCORONARY BYPASS GRAFT: Chronic | ICD-10-CM

## 2021-07-07 NOTE — CDI QUERY NOTE - NSCDIOTHERTXTBX2_GEN_ALL_CORE_FT
Use of uncertain diagnosis like possible, probable, suspected, likely, questionable or still to be ruled out and other similar terms indicating uncertainty requires further clarification at the time of discharge. This does not limit providers in using these terms at time of discharge based on your professional judgement.   Can you please clarify the etiology of chest pain at the time of discharge?    A. Chest pain due to demand ischemia from anemia and GI bleed.  B. Other, please specify  C. Not clinically significant      Chart Documentation:    6/28/21 Progress Note Adult-Cardiology Physician Assistant/Attending:  Chest Pain  - Likely demand ischemia due to anemia and active GI bleeding.   - [facility] 08/2020 with patent grafts and stents, but with known native disease.   - No interventions at this time are indicated.

## 2021-07-07 NOTE — CDI QUERY NOTE - NSCDIOTHERTXTBX_GEN_ALL_CORE_HH
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 Additionally, reports BP lower than normal at home. Transferred to [facility] for further GI workup. Now s/p EGD 6/28: erosive gastritis, Grade B esophagitis in lower third of the esophagus, normal mucosa in duodenum; esophageal hiatal hernia. S/p colonoscopy 6/29: otherwise normal colon. mild diverticulosis of sigmoid colon. grade/stage III internal hemorrhoids. Also had CT enterography with normal small bowel.     PRINCIPAL DISCHARGE DIAGNOSIS  Diagnosis: Melena        Can you please clarify the etiology of GI Bleed/ melena?   A.	GI bleed due to erosive gastritis  B.	GI Bleed due to esophagitis in lower third of the esophagus  C.	GI Bleed due to internal hemorrhoids  D.	Other, please specify  E.	Not clinically significant

## 2021-07-14 ENCOUNTER — OUTPATIENT (OUTPATIENT)
Dept: OUTPATIENT SERVICES | Facility: HOSPITAL | Age: 81
LOS: 1 days | End: 2021-07-14

## 2021-07-14 DIAGNOSIS — Z98.890 OTHER SPECIFIED POSTPROCEDURAL STATES: Chronic | ICD-10-CM

## 2021-07-14 DIAGNOSIS — Z95.5 PRESENCE OF CORONARY ANGIOPLASTY IMPLANT AND GRAFT: Chronic | ICD-10-CM

## 2021-07-14 DIAGNOSIS — Z95.1 PRESENCE OF AORTOCORONARY BYPASS GRAFT: Chronic | ICD-10-CM

## 2021-08-04 PROBLEM — K80.20 CALCULUS OF GALLBLADDER WITHOUT CHOLECYSTITIS WITHOUT OBSTRUCTION: Chronic | Status: ACTIVE | Noted: 2021-06-26

## 2021-08-04 PROBLEM — I10 ESSENTIAL (PRIMARY) HYPERTENSION: Chronic | Status: ACTIVE | Noted: 2021-06-26

## 2021-08-04 PROBLEM — S06.5X9A TRAUMATIC SUBDURAL HEMORRHAGE WITH LOSS OF CONSCIOUSNESS OF UNSPECIFIED DURATION, INITIAL ENCOUNTER: Chronic | Status: ACTIVE | Noted: 2021-06-26

## 2021-08-04 PROBLEM — I73.9 PERIPHERAL VASCULAR DISEASE, UNSPECIFIED: Chronic | Status: ACTIVE | Noted: 2021-06-26

## 2021-08-04 PROBLEM — E78.5 HYPERLIPIDEMIA, UNSPECIFIED: Chronic | Status: ACTIVE | Noted: 2021-06-26

## 2021-08-09 ENCOUNTER — APPOINTMENT (OUTPATIENT)
Dept: CT IMAGING | Facility: CLINIC | Age: 81
End: 2021-08-09
Payer: MEDICARE

## 2021-08-09 ENCOUNTER — RESULT REVIEW (OUTPATIENT)
Age: 81
End: 2021-08-09

## 2021-08-09 PROCEDURE — 74177 CT ABD & PELVIS W/CONTRAST: CPT | Mod: MH

## 2021-09-20 ENCOUNTER — NON-APPOINTMENT (OUTPATIENT)
Age: 81
End: 2021-09-20

## 2021-09-20 ENCOUNTER — APPOINTMENT (OUTPATIENT)
Dept: OPHTHALMOLOGY | Facility: CLINIC | Age: 81
End: 2021-09-20
Payer: MEDICARE

## 2021-09-20 PROCEDURE — 92014 COMPRE OPH EXAM EST PT 1/>: CPT

## 2021-09-20 PROCEDURE — 92134 CPTRZ OPH DX IMG PST SGM RTA: CPT

## 2021-09-21 ENCOUNTER — OUTPATIENT (OUTPATIENT)
Dept: OUTPATIENT SERVICES | Facility: HOSPITAL | Age: 81
LOS: 1 days | End: 2021-09-21

## 2021-09-21 DIAGNOSIS — Z95.5 PRESENCE OF CORONARY ANGIOPLASTY IMPLANT AND GRAFT: Chronic | ICD-10-CM

## 2021-09-21 DIAGNOSIS — Z98.890 OTHER SPECIFIED POSTPROCEDURAL STATES: Chronic | ICD-10-CM

## 2021-09-21 DIAGNOSIS — Z95.1 PRESENCE OF AORTOCORONARY BYPASS GRAFT: Chronic | ICD-10-CM

## 2021-12-21 ENCOUNTER — EMERGENCY (EMERGENCY)
Facility: HOSPITAL | Age: 81
LOS: 1 days | End: 2021-12-21
Admitting: EMERGENCY MEDICINE
Payer: MEDICARE

## 2021-12-21 DIAGNOSIS — Z95.1 PRESENCE OF AORTOCORONARY BYPASS GRAFT: Chronic | ICD-10-CM

## 2021-12-21 DIAGNOSIS — Z95.5 PRESENCE OF CORONARY ANGIOPLASTY IMPLANT AND GRAFT: Chronic | ICD-10-CM

## 2021-12-21 DIAGNOSIS — Z98.890 OTHER SPECIFIED POSTPROCEDURAL STATES: Chronic | ICD-10-CM

## 2021-12-21 PROCEDURE — 93010 ELECTROCARDIOGRAM REPORT: CPT | Mod: 77

## 2021-12-21 PROCEDURE — 93010 ELECTROCARDIOGRAM REPORT: CPT

## 2021-12-21 PROCEDURE — 71045 X-RAY EXAM CHEST 1 VIEW: CPT | Mod: 26

## 2021-12-21 PROCEDURE — 74176 CT ABD & PELVIS W/O CONTRAST: CPT | Mod: 26

## 2021-12-21 PROCEDURE — 99285 EMERGENCY DEPT VISIT HI MDM: CPT

## 2021-12-21 PROCEDURE — 76705 ECHO EXAM OF ABDOMEN: CPT | Mod: 26

## 2021-12-22 ENCOUNTER — INPATIENT (INPATIENT)
Facility: HOSPITAL | Age: 81
LOS: 2 days | Discharge: HOME | DRG: 444 | End: 2021-12-25
Attending: INTERNAL MEDICINE | Admitting: STUDENT IN AN ORGANIZED HEALTH CARE EDUCATION/TRAINING PROGRAM
Payer: MEDICARE

## 2021-12-22 VITALS
HEIGHT: 62 IN | HEART RATE: 78 BPM | RESPIRATION RATE: 16 BRPM | DIASTOLIC BLOOD PRESSURE: 72 MMHG | SYSTOLIC BLOOD PRESSURE: 126 MMHG | TEMPERATURE: 99 F | WEIGHT: 119.93 LBS | OXYGEN SATURATION: 95 %

## 2021-12-22 DIAGNOSIS — Z98.890 OTHER SPECIFIED POSTPROCEDURAL STATES: Chronic | ICD-10-CM

## 2021-12-22 DIAGNOSIS — Z95.1 PRESENCE OF AORTOCORONARY BYPASS GRAFT: Chronic | ICD-10-CM

## 2021-12-22 DIAGNOSIS — Z95.5 PRESENCE OF CORONARY ANGIOPLASTY IMPLANT AND GRAFT: Chronic | ICD-10-CM

## 2021-12-22 DIAGNOSIS — K80.50 CALCULUS OF BILE DUCT WITHOUT CHOLANGITIS OR CHOLECYSTITIS WITHOUT OBSTRUCTION: ICD-10-CM

## 2021-12-22 LAB
ALBUMIN SERPL ELPH-MCNC: 3 G/DL — LOW (ref 3.3–5)
ALP SERPL-CCNC: 202 U/L — HIGH (ref 40–120)
ALT FLD-CCNC: 641 U/L — HIGH (ref 12–78)
ANION GAP SERPL CALC-SCNC: 6 MMOL/L — SIGNIFICANT CHANGE UP (ref 5–17)
APTT BLD: 29.5 SEC — SIGNIFICANT CHANGE UP (ref 27.5–35.5)
AST SERPL-CCNC: 624 U/L — HIGH (ref 15–37)
BASOPHILS # BLD AUTO: 0.18 K/UL — SIGNIFICANT CHANGE UP (ref 0–0.2)
BASOPHILS NFR BLD AUTO: 1 % — SIGNIFICANT CHANGE UP (ref 0–2)
BILIRUB SERPL-MCNC: 3.1 MG/DL — HIGH (ref 0.2–1.2)
BUN SERPL-MCNC: 27 MG/DL — HIGH (ref 7–23)
CALCIUM SERPL-MCNC: 9 MG/DL — SIGNIFICANT CHANGE UP (ref 8.5–10.1)
CHLORIDE SERPL-SCNC: 111 MMOL/L — HIGH (ref 96–108)
CO2 SERPL-SCNC: 26 MMOL/L — SIGNIFICANT CHANGE UP (ref 22–31)
CREAT SERPL-MCNC: 1.18 MG/DL — SIGNIFICANT CHANGE UP (ref 0.5–1.3)
EOSINOPHIL # BLD AUTO: 0 K/UL — SIGNIFICANT CHANGE UP (ref 0–0.5)
EOSINOPHIL NFR BLD AUTO: 0 % — SIGNIFICANT CHANGE UP (ref 0–6)
GLUCOSE SERPL-MCNC: 107 MG/DL — HIGH (ref 70–99)
HCT VFR BLD CALC: 36 % — SIGNIFICANT CHANGE UP (ref 34.5–45)
HGB BLD-MCNC: 11.7 G/DL — SIGNIFICANT CHANGE UP (ref 11.5–15.5)
INR BLD: 1.39 RATIO — HIGH (ref 0.88–1.16)
LIDOCAIN IGE QN: 4229 U/L — HIGH (ref 73–393)
LYMPHOCYTES # BLD AUTO: 0.91 K/UL — LOW (ref 1–3.3)
LYMPHOCYTES # BLD AUTO: 5 % — LOW (ref 13–44)
MCHC RBC-ENTMCNC: 29 PG — SIGNIFICANT CHANGE UP (ref 27–34)
MCHC RBC-ENTMCNC: 32.5 GM/DL — SIGNIFICANT CHANGE UP (ref 32–36)
MCV RBC AUTO: 89.3 FL — SIGNIFICANT CHANGE UP (ref 80–100)
MONOCYTES # BLD AUTO: 1.46 K/UL — HIGH (ref 0–0.9)
MONOCYTES NFR BLD AUTO: 8 % — SIGNIFICANT CHANGE UP (ref 2–14)
NEUTROPHILS # BLD AUTO: 15.72 K/UL — HIGH (ref 1.8–7.4)
NEUTROPHILS NFR BLD AUTO: 82 % — HIGH (ref 43–77)
NRBC # BLD: SIGNIFICANT CHANGE UP /100 WBCS (ref 0–0)
PLATELET # BLD AUTO: 173 K/UL — SIGNIFICANT CHANGE UP (ref 150–400)
POTASSIUM SERPL-MCNC: 3.6 MMOL/L — SIGNIFICANT CHANGE UP (ref 3.5–5.3)
POTASSIUM SERPL-SCNC: 3.6 MMOL/L — SIGNIFICANT CHANGE UP (ref 3.5–5.3)
PROT SERPL-MCNC: 6.4 GM/DL — SIGNIFICANT CHANGE UP (ref 6–8.3)
PROTHROM AB SERPL-ACNC: 15.9 SEC — HIGH (ref 10.6–13.6)
RBC # BLD: 4.03 M/UL — SIGNIFICANT CHANGE UP (ref 3.8–5.2)
RBC # FLD: 14.1 % — SIGNIFICANT CHANGE UP (ref 10.3–14.5)
SARS-COV-2 RNA SPEC QL NAA+PROBE: SIGNIFICANT CHANGE UP
SODIUM SERPL-SCNC: 143 MMOL/L — SIGNIFICANT CHANGE UP (ref 135–145)
WBC # BLD: 18.28 K/UL — HIGH (ref 3.8–10.5)
WBC # FLD AUTO: 18.28 K/UL — HIGH (ref 3.8–10.5)

## 2021-12-22 PROCEDURE — C9399: CPT

## 2021-12-22 PROCEDURE — U0005: CPT

## 2021-12-22 PROCEDURE — C1769: CPT

## 2021-12-22 PROCEDURE — 85027 COMPLETE CBC AUTOMATED: CPT

## 2021-12-22 PROCEDURE — 83690 ASSAY OF LIPASE: CPT

## 2021-12-22 PROCEDURE — 85610 PROTHROMBIN TIME: CPT

## 2021-12-22 PROCEDURE — 93005 ELECTROCARDIOGRAM TRACING: CPT

## 2021-12-22 PROCEDURE — 85730 THROMBOPLASTIN TIME PARTIAL: CPT

## 2021-12-22 PROCEDURE — U0003: CPT

## 2021-12-22 PROCEDURE — 36415 COLL VENOUS BLD VENIPUNCTURE: CPT

## 2021-12-22 PROCEDURE — 43274 ERCP DUCT STENT PLACEMENT: CPT

## 2021-12-22 PROCEDURE — 82248 BILIRUBIN DIRECT: CPT

## 2021-12-22 PROCEDURE — 80053 COMPREHEN METABOLIC PANEL: CPT

## 2021-12-22 PROCEDURE — 86850 RBC ANTIBODY SCREEN: CPT

## 2021-12-22 PROCEDURE — 85025 COMPLETE CBC W/AUTO DIFF WBC: CPT

## 2021-12-22 PROCEDURE — 86900 BLOOD TYPING SEROLOGIC ABO: CPT

## 2021-12-22 PROCEDURE — 76000 FLUOROSCOPY <1 HR PHYS/QHP: CPT

## 2021-12-22 PROCEDURE — 99285 EMERGENCY DEPT VISIT HI MDM: CPT

## 2021-12-22 PROCEDURE — 99223 1ST HOSP IP/OBS HIGH 75: CPT

## 2021-12-22 PROCEDURE — C2625: CPT

## 2021-12-22 PROCEDURE — 86901 BLOOD TYPING SEROLOGIC RH(D): CPT

## 2021-12-22 RX ORDER — ONDANSETRON 8 MG/1
4 TABLET, FILM COATED ORAL EVERY 8 HOURS
Refills: 0 | Status: DISCONTINUED | OUTPATIENT
Start: 2021-12-22 | End: 2021-12-25

## 2021-12-22 RX ORDER — NITROGLYCERIN 6.5 MG
0.4 CAPSULE, EXTENDED RELEASE ORAL
Refills: 0 | Status: DISCONTINUED | OUTPATIENT
Start: 2021-12-22 | End: 2021-12-25

## 2021-12-22 RX ORDER — PIPERACILLIN AND TAZOBACTAM 4; .5 G/20ML; G/20ML
3.38 INJECTION, POWDER, LYOPHILIZED, FOR SOLUTION INTRAVENOUS EVERY 8 HOURS
Refills: 0 | Status: DISCONTINUED | OUTPATIENT
Start: 2021-12-22 | End: 2021-12-25

## 2021-12-22 RX ORDER — ATORVASTATIN CALCIUM 80 MG/1
10 TABLET, FILM COATED ORAL DAILY
Refills: 0 | Status: DISCONTINUED | OUTPATIENT
Start: 2021-12-22 | End: 2021-12-25

## 2021-12-22 RX ORDER — OXYCODONE HYDROCHLORIDE 5 MG/1
5 TABLET ORAL ONCE
Refills: 0 | Status: DISCONTINUED | OUTPATIENT
Start: 2021-12-22 | End: 2021-12-23

## 2021-12-22 RX ORDER — SODIUM CHLORIDE 9 MG/ML
1000 INJECTION INTRAMUSCULAR; INTRAVENOUS; SUBCUTANEOUS ONCE
Refills: 0 | Status: COMPLETED | OUTPATIENT
Start: 2021-12-22 | End: 2021-12-22

## 2021-12-22 RX ORDER — PIPERACILLIN AND TAZOBACTAM 4; .5 G/20ML; G/20ML
3.38 INJECTION, POWDER, LYOPHILIZED, FOR SOLUTION INTRAVENOUS ONCE
Refills: 0 | Status: COMPLETED | OUTPATIENT
Start: 2021-12-22 | End: 2021-12-22

## 2021-12-22 RX ORDER — LANOLIN ALCOHOL/MO/W.PET/CERES
1 CREAM (GRAM) TOPICAL
Qty: 0 | Refills: 0 | DISCHARGE

## 2021-12-22 RX ORDER — OXYCODONE HYDROCHLORIDE 5 MG/1
5 TABLET ORAL EVERY 4 HOURS
Refills: 0 | Status: DISCONTINUED | OUTPATIENT
Start: 2021-12-22 | End: 2021-12-25

## 2021-12-22 RX ORDER — ONDANSETRON 8 MG/1
4 TABLET, FILM COATED ORAL EVERY 6 HOURS
Refills: 0 | Status: DISCONTINUED | OUTPATIENT
Start: 2021-12-22 | End: 2021-12-23

## 2021-12-22 RX ORDER — ACETAMINOPHEN 500 MG
650 TABLET ORAL EVERY 4 HOURS
Refills: 0 | Status: DISCONTINUED | OUTPATIENT
Start: 2021-12-22 | End: 2021-12-25

## 2021-12-22 RX ORDER — LANOLIN ALCOHOL/MO/W.PET/CERES
3 CREAM (GRAM) TOPICAL AT BEDTIME
Refills: 0 | Status: DISCONTINUED | OUTPATIENT
Start: 2021-12-22 | End: 2021-12-25

## 2021-12-22 RX ORDER — FENTANYL CITRATE 50 UG/ML
50 INJECTION INTRAVENOUS
Refills: 0 | Status: DISCONTINUED | OUTPATIENT
Start: 2021-12-22 | End: 2021-12-23

## 2021-12-22 RX ORDER — SODIUM CHLORIDE 9 MG/ML
1000 INJECTION INTRAMUSCULAR; INTRAVENOUS; SUBCUTANEOUS
Refills: 0 | Status: DISCONTINUED | OUTPATIENT
Start: 2021-12-22 | End: 2021-12-23

## 2021-12-22 RX ORDER — LABETALOL HCL 100 MG
10 TABLET ORAL ONCE
Refills: 0 | Status: COMPLETED | OUTPATIENT
Start: 2021-12-22 | End: 2021-12-22

## 2021-12-22 RX ORDER — ACETAMINOPHEN 500 MG
650 TABLET ORAL EVERY 6 HOURS
Refills: 0 | Status: DISCONTINUED | OUTPATIENT
Start: 2021-12-22 | End: 2021-12-25

## 2021-12-22 RX ORDER — ASPIRIN/CALCIUM CARB/MAGNESIUM 324 MG
81 TABLET ORAL DAILY
Refills: 0 | Status: DISCONTINUED | OUTPATIENT
Start: 2021-12-22 | End: 2021-12-22

## 2021-12-22 RX ORDER — ACETAMINOPHEN 500 MG
1000 TABLET ORAL ONCE
Refills: 0 | Status: DISCONTINUED | OUTPATIENT
Start: 2021-12-22 | End: 2021-12-23

## 2021-12-22 RX ORDER — SODIUM CHLORIDE 9 MG/ML
1000 INJECTION, SOLUTION INTRAVENOUS
Refills: 0 | Status: DISCONTINUED | OUTPATIENT
Start: 2021-12-22 | End: 2021-12-23

## 2021-12-22 RX ADMIN — PIPERACILLIN AND TAZOBACTAM 25 GRAM(S): 4; .5 INJECTION, POWDER, LYOPHILIZED, FOR SOLUTION INTRAVENOUS at 23:17

## 2021-12-22 RX ADMIN — PIPERACILLIN AND TAZOBACTAM 200 GRAM(S): 4; .5 INJECTION, POWDER, LYOPHILIZED, FOR SOLUTION INTRAVENOUS at 10:40

## 2021-12-22 RX ADMIN — SODIUM CHLORIDE 50 MILLILITER(S): 9 INJECTION INTRAMUSCULAR; INTRAVENOUS; SUBCUTANEOUS at 10:40

## 2021-12-22 RX ADMIN — Medication 10 MILLIGRAM(S): at 16:45

## 2021-12-22 RX ADMIN — SODIUM CHLORIDE 1000 MILLILITER(S): 9 INJECTION INTRAMUSCULAR; INTRAVENOUS; SUBCUTANEOUS at 10:38

## 2021-12-22 RX ADMIN — Medication 81 MILLIGRAM(S): at 18:18

## 2021-12-22 RX ADMIN — SODIUM CHLORIDE 50 MILLILITER(S): 9 INJECTION INTRAMUSCULAR; INTRAVENOUS; SUBCUTANEOUS at 17:19

## 2021-12-22 RX ADMIN — SODIUM CHLORIDE 1000 MILLILITER(S): 9 INJECTION INTRAMUSCULAR; INTRAVENOUS; SUBCUTANEOUS at 06:41

## 2021-12-22 RX ADMIN — PIPERACILLIN AND TAZOBACTAM 25 GRAM(S): 4; .5 INJECTION, POWDER, LYOPHILIZED, FOR SOLUTION INTRAVENOUS at 17:19

## 2021-12-22 NOTE — PATIENT PROFILE ADULT - CENTRAL VENOUS CATHETER/PICC LINE
Re-evaluation / Daily Note     Today's date: 2018  Patient name: Raf Woodall  : 1943  MRN: [id number]  Referring provider: Bronson Aguilar MD  Dx:   Encounter Diagnosis     ICD-10-CM    1  Leg weakness, bilateral [id number]         Subjective: Patient to therapy with wife, wife reports patient is very inactive, only walking from bedroom to office  Patient reports his back is painful from clearing out boxes, as he sold their house and likely will be moving from the end of         Objective: See treatment diary below    Specialty Daily Treatment Diary      Manual   18   Hip long axis distraction 10 min with hooklying hip extension stretching  10 min with hooklying hip extension stretching  5 min hooklying, with hip extension stretches 5 min 1 min each   STM to lumbar paraspinals                                                 Exercise Diary  18   Sit <> stands  21" surface, 10  21" surface, 15 x 2 sets  20" surface, 15  19" surface, 10 x 2 sets   4-way hip  flexion, abduction, extension, 10 each  flexion, abduction, extension, 12 each  standing hip abduction, extension, light TB, 10 each each exercise  Standing hip [id number] each Standing hip extension 10 each  Standing hip abduction 10 each   Heel raises 10  10 each with increased weight bearing  20  10 each with increased weight bearing 15 total B 15   bridge 10  10 each with increased weight bearing 15 increased weight on one side 10 increased one-sided weight beraing 10 x 2 sets   PPT     bilateral bent knee raise, 10 x 2 sets hooklying bent knee raise, 10 each  SLR, 10 each x 2 sets   HL trunk rotation Seated trunk rotation stretch, 1 min each      Supine hip Add   sidelying hip abduction, 12 each      Supine marches    SLR, 12 each  SLR 15 each    Trunk flex stretch on physioball       physioball roll-outs          Ambulation          Step-ups  4" step, lateral, plinth anterior, 15 each, then 12 each   4" step, lateral, plinth anterior, 15 each  6" step, ipsilateral railing, 12, 7 on R  10 each with 2 railings 6" step 10-15 each   Dynamic balance  heel to toe weight shift, 1 min  heel to toe weight shift, 1 min  FTEC, 1 min      Static balance          Paraspinal release with firm ball           hip stretches/other stretches       Knee to chest stretch, 1 B  Hip ER stretch, 1 B  Hip flexor stretch, 1 B                                                                                   Assessment:  Patient was able to get through exercises today with some decrease in back pain following stretches and active exercise  He needs to be more active at home  Patient instructed in written modified HEP, good return demonstration  Plan:  Patient will benefit from skilled outpatient physical therapy 2x/wk for 6-8 wks  A list of appropriate exercises can be found in the objective section of this note  Therapeutic exercise and neuromuscular re-education to improve endurance, LE strength and static/dynamic balance  no

## 2021-12-22 NOTE — ED ADULT NURSE REASSESSMENT NOTE - NS ED NURSE REASSESS COMMENT FT1
Pt received alert and oriented VSS afebrile. Pt resting comfortably denies pain-has occasional nausea with abdominal pain-prn medication ordered and given as needed. Pt on IVF and antibiotics. Pt NPO for ERCP today. All needs addressed-Covid swab pending.

## 2021-12-22 NOTE — H&P ADULT - NSHPPHYSICALEXAM_GEN_ALL_CORE
ICU Vital Signs Last 24 Hrs  T(C): 36.8 (22 Dec 2021 07:29), Max: 37 (22 Dec 2021 04:32)  T(F): 98.3 (22 Dec 2021 07:29), Max: 98.6 (22 Dec 2021 04:32)  HR: 70 (22 Dec 2021 07:29) (70 - 78)  BP: 113/74 (22 Dec 2021 07:29) (113/74 - 126/72)  BP(mean): 83 (22 Dec 2021 07:29) (83 - 83)  ABP: --  ABP(mean): --  RR: 16 (22 Dec 2021 07:29) (16 - 16)  SpO2: 95% (22 Dec 2021 07:29) (95% - 95%)      PHYSICAL EXAM:    Constitutional: NAD, awake and alert  HEENT: PERR, EOMI, Normal Hearing, MMM  Neck: Soft and supple, No LAD, No JVD  Respiratory: Breath sounds are clear bilaterally, No wheezing, rales or rhonchi  Cardiovascular: S1 and S2, regular rate and rhythm, no Murmurs, gallops or rubs  Gastrointestinal: Bowel Sounds present, soft, nontender, nondistended, no guarding, no rebound  Extremities: No peripheral edema  Vascular: 2+ peripheral pulses  Neurological: A/O x 3, no focal deficits  Musculoskeletal: 5/5 strength b/l upper and lower extremities  Skin: No rashes ICU Vital Signs Last 24 Hrs  T(C): 36.8 (22 Dec 2021 07:29), Max: 37 (22 Dec 2021 04:32)  T(F): 98.3 (22 Dec 2021 07:29), Max: 98.6 (22 Dec 2021 04:32)  HR: 70 (22 Dec 2021 07:29) (70 - 78)  BP: 113/74 (22 Dec 2021 07:29) (113/74 - 126/72)  BP(mean): 83 (22 Dec 2021 07:29) (83 - 83)  ABP: --  ABP(mean): --  RR: 16 (22 Dec 2021 07:29) (16 - 16)  SpO2: 95% (22 Dec 2021 07:29) (95% - 95%)      PHYSICAL EXAM:    Constitutional: NAD, awake and alert  HEENT: PERR, EOMI, Normal Hearing, MMM  Neck: Soft and supple, No LAD, No JVD  Respiratory: Breath sounds are clear bilaterally, No wheezing, rales or rhonchi  Cardiovascular: S1 and S2, regular rate and rhythm, no Murmurs, gallops or rubs  Gastrointestinal: Bowel Sounds present, soft, nontender, nondistended, no guarding, no rebound. + RUQ ttp  Extremities: No peripheral edema  Vascular: 2+ peripheral pulses  Neurological: A/O x 3, no focal deficits  Musculoskeletal: 5/5 strength b/l upper and lower extremities  Skin: No rashes

## 2021-12-22 NOTE — H&P ADULT - ASSESSMENT
#Gallstone pancreatitis  #Choledocholithiasis/Cholelithiasis  #r/o cholangitis  -admit to [facility]  -NPO for ERCP later today  -NS@75 cc/hr  -Surgery and cardio consults for eventual cholecystectomy and cardio clearance  -start zosyn 3.375 iv q8h      #CAD/CABG/PVD/R CEA/HTN  -hold plavix today - called primary cardiologist awaiting call back  -Continue ASA - last cardiac stent 2019  -Iliac stent 2018      DVT px: hold heparin today- pt for ercp later this afternoon, start thereafter             #Gallstone pancreatitis  #Choledocholithiasis/Cholelithiasis  #cholangitis   -admit to MS  -NPO for ERCP later today  -NS@75 cc/hr  -Surgery and cardio consults for eventual cholecystectomy and cardio clearance   -start zosyn 3.375 iv q8h  -discussed case with cardio and surgery svcs-> pt very high risk for surgical intervention, given these circumstances, she will have ercp and monitor leukocytosis. Her leukocytosis is likely a reflection of cholangitis and not acute cholecystitis itself. Will continue to monitor wbc count along with LFTs. IF acute cholecystitis continue to remain on differential after ERCP, HIDA scan may be performed. If positive, can revisit option of surgery at that time. However surgery is more risk than benefit at this juncture.       #CAD/CABG/PVD/R CEA/HTN  -hold plavix today - called primary cardiologist awaiting call back-> resume plavix in am  -Continue ASA - last cardiac stent 2019  -Iliac stent 2018  -Had fixed defect on recent nuclear study last month with her primayr cardiologist      DVT px: hold heparin today- pt for ercp later this afternoon, start thereafter

## 2021-12-22 NOTE — H&P ADULT - HISTORY OF PRESENT ILLNESS
81y/oF PMH CAD s/p CABG x3 (3/2013), multiple PCI/HOLGER (most recently 12/2019), ruptured cerebral aneurysm (s/p coiling 5/2016), carotid stenosis (s/p R CEA 2013), PVD s/p iliac stents, HTN, HLD,  poss IBS transferred from [facility] for abdominal pain 2/2 to choledocholithiasis and gallstone pancreatitis. Plan for ercop with Dr Aguirre.   LFTs: T bili: 3  Alk phso: 202  AST/ALT: 624/640  Lipase: 4200  CT abd pelvis grossly demonstrated gallstone with gallbladder wall thickening, and betty-panreatic inflammation.  Acute pancreatitis. No pseudocyst   formation. Cholelithiasis and choledocholithiasis. No secondary signs of acute cholecystitis on this modality. CBD mildly dilated to 10 mm.   RUQ US shows cholelithiasis with CBD 6mm.                    PAST MEDICAL & SURGICAL HISTORY:  HTN (hypertension)    HLD (hyperlipidemia)    PVD (peripheral vascular disease)    SDH (subdural hematoma)    Coronary artery graft present    Cholelithiasis    S/P CABG x 3  2013    History of right-sided carotid endarterectomy  2013    History of surgery for cerebral aneurysm  s/p coiling of ruptured aneurysm, 2016    S/P coronary artery stent placement  x2 (2/2018), x1 (12/2018), x2 (2019), x1 (2019)        FAMILY HISTORY:  No pertinent family history in first degree relatives        SOCIAL HISTORY:  Smoking Status: [ ] Current, [ ] Former, [ x] Never  Pack Years:  [ x ] EtOH-no  [ x ] IVDA-no     81y/oF PMH CAD s/p CABG x3 (3/2013), multiple PCI/HOLGER (most recently 12/2019), ruptured cerebral aneurysm (s/p coiling 5/2016), carotid stenosis (s/p R CEA 2013), PVD s/p iliac stents, HTN, HLD,  poss IBS transferred from [facility] for abdominal pain 2/2 to choledocholithiasis and gallstone pancreatitis. Plan for ercp with Dr Aguirre. Denies fevers of chills. No chest pain. + RUQ pain. WBC 18. Afebrile  LFTs: T bili: 3  Alk phso: 202  AST/ALT: 624/640  Lipase: 4200  CT abd pelvis grossly demonstrated gallstone with gallbladder wall thickening, and betty-panreatic inflammation.  Acute pancreatitis. No pseudocyst   formation. Cholelithiasis and choledocholithiasis. No secondary signs of acute cholecystitis on this modality. CBD mildly dilated to 10 mm.   RUQ US shows cholelithiasis with CBD 6mm.                    PAST MEDICAL & SURGICAL HISTORY:  HTN (hypertension)    HLD (hyperlipidemia)    PVD (peripheral vascular disease)    SDH (subdural hematoma)    Coronary artery graft present    Cholelithiasis    S/P CABG x 3  2013    History of right-sided carotid endarterectomy  2013    History of surgery for cerebral aneurysm  s/p coiling of ruptured aneurysm, 2016    S/P coronary artery stent placement  x2 (2/2018), x1 (12/2018), x2 (2019), x1 (2019)        FAMILY HISTORY:  No pertinent family history in first degree relatives        SOCIAL HISTORY:  Smoking Status: [ ] Current, [ ] Former, [ x] Never  Pack Years:  [ x ] EtOH-no  [ x ] IVDA-no

## 2021-12-22 NOTE — H&P ADULT - NSHPLABSRESULTS_GEN_ALL_CORE
LABS: All Labs Reviewed:                        11.7   18.28 )-----------( 173      ( 22 Dec 2021 06:33 )             36.0     12-22    143  |  111<H>  |  27<H>  ----------------------------<  107<H>  3.6   |  26  |  1.18    Ca    9.0      22 Dec 2021 06:33    TPro  6.4  /  Alb  3.0<L>  /  TBili  3.1<H>  /  DBili  x   /  AST  624<H>  /  ALT  641<H>  /  AlkPhos  202<H>  12-22    PT/INR - ( 22 Dec 2021 06:33 )   PT: 15.9 sec;   INR: 1.39 ratio         PTT - ( 22 Dec 2021 06:33 )  PTT:29.5 sec          Blood Culture:

## 2021-12-22 NOTE — ED ADULT NURSE NOTE - CHIEF COMPLAINT QUOTE
Transfer from [facility], accepting MD Aguirre (GI). Sent to  ED for for rule out pancreatitis, GI consult and possible ERCP. Patient denies any complaints, requesting to go home. IVF and Zofran given at [place] with relief of symptoms.

## 2021-12-22 NOTE — PATIENT PROFILE ADULT - FALL HARM RISK - HARM RISK INTERVENTIONS

## 2021-12-22 NOTE — BRIEF OPERATIVE NOTE - OPERATION/FINDINGS
ERCp with choledocholithiasis and stent placement.   No sphincterotomy performed due to recent plavix use and this was an emergency.

## 2021-12-22 NOTE — CONSULT NOTE ADULT - SUBJECTIVE AND OBJECTIVE BOX
82 yo fem former smoker, emphysema,  multiple cardiac stents (most recent 12/2019), iliac stents, , ruptured cerebral aneurysm (s/p coiling 5/2016), on plavix, RT CEA 2013 transferred from [facility] for ERCP due to CBD stone, abdominal pain x last 2 weeks, pancreatitis (lipase 4000), T estuardo 3.1, AST//640,  WBC 18,000, afebrile. patient known to have gallstones and had been told in the past to not have gallbladder surgery based on her higher surgical risk.  Patient c/o some epigastric pain.    HPI:  81y/oF PMH CAD s/p CABG x3 (3/2013), multiple PCI/HOLGER (most recently 12/2019), ruptured cerebral aneurysm (s/p coiling 5/2016), carotid stenosis (s/p R CEA 2013), PVD s/p iliac stents, HTN, HLD,  poss IBS transferred from [facility] for abdominal pain 2/2 to choledocholithiasis and gallstone pancreatitis. Plan for ercp with Dr Aguirre. Denies fevers of chills. No chest pain. + RUQ pain. WBC 18. Afebrile  LFTs: T bili: 3  Alk phso: 202  AST/ALT: 624/640  Lipase: 4200  CT abd pelvis grossly demonstrated gallstone with gallbladder wall thickening, and betty-panreatic inflammation.  Acute pancreatitis. No pseudocyst   formation. Cholelithiasis and choledocholithiasis. No secondary signs of acute cholecystitis on this modality. CBD mildly dilated to 10 mm.   RUQ US shows cholelithiasis with CBD 6mm.                    PAST MEDICAL & SURGICAL HISTORY:  HTN (hypertension)    HLD (hyperlipidemia)    PVD (peripheral vascular disease)    SDH (subdural hematoma)    Coronary artery graft present    Cholelithiasis    S/P CABG x 3  2013    History of right-sided carotid endarterectomy  2013    History of surgery for cerebral aneurysm  s/p coiling of ruptured aneurysm, 2016    S/P coronary artery stent placement  x2 (2/2018), x1 (12/2018), x2 (2019), x1 (2019)        FAMILY HISTORY:  No pertinent family history in first degree relatives        SOCIAL HISTORY:  Smoking Status: [ ] Current, [ x] Former, [ ] Never  Pack Years:  [ x ] EtOH-no  [ x ] IVDA-no     (22 Dec 2021 07:58)      PAST MEDICAL & SURGICAL HISTORY:  HTN (hypertension)    HLD (hyperlipidemia)    PVD (peripheral vascular disease)    SDH (subdural hematoma)    Coronary artery graft present    Cholelithiasis    S/P CABG x 3  2013    History of right-sided carotid endarterectomy  2013    History of surgery for cerebral aneurysm  s/p coiling of ruptured aneurysm, 2016    S/P coronary artery stent placement  x2 (2/2018), x1 (12/2018), x2 (2019), x1 (2019)        REVIEW OF SYSTEMS:    CONSTITUTIONAL: No weakness, fevers or chills  EYES/ENT: No visual changes;  No vertigo or throat pain   NECK: No pain or stiffness  RESPIRATORY: No cough, wheezing, hemoptysis; No shortness of breath  CARDIOVASCULAR: No chest pain or palpitations  GASTROINTESTINAL:  abdominal  epigastric pain. No nausea, vomiting, or hematemesis; No diarrhea or constipation. No melena or hematochezia.  GENITOURINARY: No dysuria, frequency or hematuria  NEUROLOGICAL: No numbness or weakness  SKIN: No itching, burning, rashes, or lesions   All other review of systems is negative unless indicated above.    MEDICATIONS  (STANDING):  aspirin  chewable 81 milliGRAM(s) Oral daily  atorvastatin Oral Tab/Cap - Peds 10 milliGRAM(s) Oral daily  piperacillin/tazobactam IVPB. 3.375 Gram(s) IV Intermittent once  piperacillin/tazobactam IVPB.. 3.375 Gram(s) IV Intermittent every 8 hours  sodium chloride 0.9%. 1000 milliLiter(s) (50 mL/Hr) IV Continuous <Continuous>    MEDICATIONS  (PRN):  acetaminophen     Tablet .. 650 milliGRAM(s) Oral every 6 hours PRN Temp greater or equal to 38C (100.4F), Mild Pain (1 - 3)  acetaminophen     Tablet .. 650 milliGRAM(s) Oral every 4 hours PRN Mild Pain (1 - 3)  aluminum hydroxide/magnesium hydroxide/simethicone Suspension 30 milliLiter(s) Oral every 4 hours PRN Dyspepsia  melatonin 3 milliGRAM(s) Oral at bedtime PRN Insomnia  nitroglycerin     SubLingual 0.4 milliGRAM(s) SubLingual every 5 minutes PRN Chest Pain  ondansetron Injectable 4 milliGRAM(s) IV Push every 8 hours PRN Nausea and/or Vomiting  oxyCODONE    IR 5 milliGRAM(s) Oral every 4 hours PRN Moderate Pain (4 - 6)      Allergies    No Known Allergies    Intolerances        SOCIAL HISTORY:    FAMILY HISTORY:  No pertinent family history in first degree relatives        Vital Signs Last 24 Hrs  T(C): 36.8 (22 Dec 2021 07:29), Max: 37 (22 Dec 2021 04:32)  T(F): 98.3 (22 Dec 2021 07:29), Max: 98.6 (22 Dec 2021 04:32)  HR: 70 (22 Dec 2021 07:29) (70 - 78)  BP: 113/74 (22 Dec 2021 07:29) (113/74 - 126/72)  BP(mean): 83 (22 Dec 2021 07:29) (83 - 83)  RR: 16 (22 Dec 2021 07:29) (16 - 16)  SpO2: 95% (22 Dec 2021 07:29) (95% - 95%)    .  VITAL SIGNS:  T(C): 36.8 (12-22-21 @ 07:29), Max: 37 (12-22-21 @ 04:32)  T(F): 98.3 (12-22-21 @ 07:29), Max: 98.6 (12-22-21 @ 04:32)  HR: 70 (12-22-21 @ 07:29) (70 - 78)  BP: 113/74 (12-22-21 @ 07:29) (113/74 - 126/72)  BP(mean): 83 (12-22-21 @ 07:29) (83 - 83)  RR: 16 (12-22-21 @ 07:29) (16 - 16)  SpO2: 95% (12-22-21 @ 07:29) (95% - 95%)  Wt(kg): --    PHYSICAL EXAM:    Constitutional: resting comfortably in bed; NAD  Eyes: PERRL, EOMI, anicteric sclera  ENT: no nasal discharge; uvula midline, no oropharyngeal erythema or exudates  Neck: supple; no JVD or thyromegaly  Respiratory: CTA B/L; no W/R/R, no retractions  Cardiac: +S1/S2; RRR; no murmurs  Gastrointestinal: soft, mild epigastric tenderness  Back: spine midline, no bony tenderness or step-offs; no CVAT B/L  Extremities: no clubbing or cyanosis; no peripheral edema  Musculoskeletal: NROM x4; no joint swelling, tenderness or erythema  Vascular: 2+ radial, femoral, DP/PT pulses B/L  Dermatologic: skin warm, dry and intact; no rashes, wounds, or scars  Lymphatic: no submandibular or cervical LAD  Neurologic: AAOx3; CNII-XII grossly intact; no focal deficits  Psychiatric: affect and characteristics of appearance, verbalizations, behaviors are appropriate    LABS:                        11.7   18.28 )-----------( 173      ( 22 Dec 2021 06:33 )             36.0       12-22    143  |  111<H>  |  27<H>  ----------------------------<  107<H>  3.6   |  26  |  1.18    Ca    9.0      22 Dec 2021 06:33    TPro  6.4  /  Alb  3.0<L>  /  TBili  3.1<H>  /  DBili  x   /  AST  624<H>  /  ALT  641<H>  /  AlkPhos  202<H>  12-22      PT/INR - ( 22 Dec 2021 06:33 )   PT: 15.9 sec;   INR: 1.39 ratio         PTT - ( 22 Dec 2021 06:33 )  PTT:29.5 sec        RADIOLOGY & ADDITIONAL STUDIES:    RUQ US showed gallstones,    CT Abd: pancreatitis, CBD 10mm, gallstones

## 2021-12-22 NOTE — ED ADULT NURSE REASSESSMENT NOTE - NS ED NURSE REASSESS COMMENT FT1
Pt received alert and oriented VSS afebrile. Pt resting comfortably has no needs at this time. Pt NPO for ERCP later today. Covid swabbing pending.

## 2021-12-22 NOTE — ED PROVIDER NOTE - PHYSICAL EXAMINATION
Gen:  Well appearing in NAD  Head:  NC/AT  HEENT: pupils perrl,no pharyngeal erythema, uvula midline  Cardiac: S1S2, RRR  Abd: Soft, non tender  Resp: No distress, CTA   musculoskeletal:: no deformities, no swelling, strength +5/+5  Skin: warm and dry as visualized, no rashes  Neuro: ERICKA, cn2-12, aao x 3  Psych:alert, cooperative, appropriate mood and affect for situation

## 2021-12-22 NOTE — ED ADULT NURSE REASSESSMENT NOTE - NS ED NURSE REASSESS COMMENT FT1
Pt received from ED. Pt a/ox4. Pt ambulatory to self. Report given to Endo. Pt  scheduled for later in afternoon.

## 2021-12-22 NOTE — ED ADULT NURSE REASSESSMENT NOTE - NS ED NURSE REASSESS COMMENT FT1
81 year old female, AAO X3, verbalize relief of pain, on iv fluids, all safety maintain. close monitoring.

## 2021-12-22 NOTE — PATIENT PROFILE ADULT - PRO INTERPRETER NEED 2
Quality 337: Tuberculosis Prevention For Psoriasis And Psoriatic Arthritis Patients On A Biological Immune Response Modifier: No documentation of negative or managed positive TB screen Quality 205 Hiv/Aids: Sexually Transmitted Disease Screening For Chlamydia, Gonorrhea, And Syphilis: Patient refused screening. Detail Level: Detailed Quality 226: Preventive Care And Screening: Tobacco Use: Screening And Cessation Intervention: Tobacco Screening not Performed for Unknown Reasons Quality 137: Melanoma: Continuity Of Care - Recall System: Recall system not utilized, reason not otherwise specified Quality 93: Acute Otitis Externa (Aoe): Systemic Antimicrobial Therapy - Avoidance Or Inappropriate Use: Physician did NOT prescribing a systemic antibiotic for AOE Quality 47: Advance Care Plan: Advance care planning not documented, reason not otherwise specified. Quality 265: Biopsy Follow-Up: Biopsy Results not Reviewed, not Communicated to the Patient and the Patient's Primary Care/Referring Physician, Communication not Tracked in a Log, and/or Tracking Process not Documented in the Patient's Medical Record. Quality 130: Documentation Of Current Medications In The Medical Record: Current Medications Documented Quality 143: Oncology: Medical And Radiation- Pain Intensity Quantified: Pain severity not assessed. Quality 127: Diabetic Foot And Ankle Care, Ulcer Prevention - Evaluation Of Footwear: Footwear Evaluation not Performed Quality 431: Preventive Care And Screening: Unhealthy Alcohol Use - Screening: Unhealthy alcohol use screening not performed, reason not otherwise specified English

## 2021-12-22 NOTE — CONSULT NOTE ADULT - SUBJECTIVE AND OBJECTIVE BOX
Patient is a 81y old  Female who presents with a chief complaint of transfer from [facility] (22 Dec 2021 10:32)    ________________________________  PCesilia UMANZOR is a 81y year old Female with a past medical history of multivessel coronary disease status post CABG 2013, with most recent coronary angiogram from August 2019, at which time she had a drug-eluting stent placed in the mid circumflex.  Prior cardiogram from August 2019 showed a proximal 50% stenosis in the LAD, 90% stenosis in the LAD for drug-eluting stent was placed, patent LIMA to the LAD, 90% stenosis in the saphenous vein to the first diagonal branch for which a stent was placed, peripheral arterial disease status post stenting, hypertension, hyperlipidemia, history of cerebral aneurysm status post coiling, history of GI bleed who was admitted to [facility] for abdominal pain s/p choledocholithiasis and gallstone pancreatitis.  She was sent to  for further management.   No CP, SOB or palpitations.       PREVIOUS CARDIAC WORKUP:    Echocardiogram 6/22/2020  1: Normal left ventricular size and systolic function, EF 51%  2: Atypical septal motion consistent with prior cardiac surgery  3: No significant valvular heart disease    Stress Test 9/23/2021  1. Baseline EKG: RSR, ST depression in the inferior and lateral leads.  No additional EKG changes or ectopy seen following Lexiscan infusion.  2. Mildly decreased perfusion in the apical and anteroapical regions with normal wall motion   indicating apical thinning. Homogeneous perfusion in the remainder cardioembolic during rest   and stress.  3. Lexiscan nuclear stress test reveals abnormal resting EKG with no additional EKG changes or  ectopy seen following Lexiscan infusion. Nuclear images reveal apical thinning and no evidence  of ischemia or infarction. Normal LV function, LVEF 52%.  4. Mild septal hypokinesis with normal septal thickening. Normal wall motion in all other   myocardial segments. LVEF 52%.  5. This is a low risk pharmacological nuclear stress test.  ________________________________  Review of systems: A 10 point review of system has been performed, and is negative except for what has been mentioned in the above history of present illness.     PAST MEDICAL & SURGICAL HISTORY:  HTN (hypertension)    HLD (hyperlipidemia)    PVD (peripheral vascular disease)    SDH (subdural hematoma)    Coronary artery graft present    Cholelithiasis    S/P CABG x 3  2013    History of right-sided carotid endarterectomy  2013    History of surgery for cerebral aneurysm  s/p coiling of ruptured aneurysm, 2016    S/P coronary artery stent placement  x2 (2/2018), x1 (12/2018), x2 (2019), x1 (2019)      FAMILY HISTORY:  No pertinent family history in first degree relatives    SOCIAL HISTORY: The patient denies any history of tobacco abuse, alcohol abuse or illicit drug use.    ALLERGIES:  No Known Allergies    Home Medications:  aspirin 81 mg oral tablet: 1 tab(s) orally once a day (22 Dec 2021 09:16)  atorvastatin 10 mg oral tablet: 1 tab(s) orally once a day (22 Dec 2021 09:16)  calcium (as carbonate) 600 mg oral tablet: 1 tab(s) orally once a day (22 Dec 2021 09:16)  clopidogrel 75 mg oral tablet: 1 tab(s) orally once a day (22 Dec 2021 09:16)  furosemide 20 mg oral tablet: 1 tab(s) orally once a day (22 Dec 2021 09:16)  nitroglycerin 0.4 mg sublingual tablet: 1 tab(s) sublingual every 5 minutes, As Needed (22 Dec 2021 09:16)  potassium chloride 10 mEq oral tablet, extended release: 1 tab(s) orally once a day (22 Dec 2021 09:16)  Vitamin B-12 1000 mcg oral tablet: 1 tab(s) orally once a day (22 Dec 2021 09:16)  Vitamin C 500 mg oral tablet: 1 tab(s) orally once a day (22 Dec 2021 09:16)  Vitamin D3 2000 intl units (50 mcg) oral tablet: 1 tab(s) orally once a day (22 Dec 2021 09:16)    MEDICATIONS  (STANDING):  aspirin  chewable 81 milliGRAM(s) Oral daily  atorvastatin Oral Tab/Cap - Peds 10 milliGRAM(s) Oral daily  piperacillin/tazobactam IVPB.. 3.375 Gram(s) IV Intermittent every 8 hours  sodium chloride 0.9%. 1000 milliLiter(s) (50 mL/Hr) IV Continuous <Continuous>    MEDICATIONS  (PRN):  acetaminophen     Tablet .. 650 milliGRAM(s) Oral every 6 hours PRN Temp greater or equal to 38C (100.4F), Mild Pain (1 - 3)  acetaminophen     Tablet .. 650 milliGRAM(s) Oral every 4 hours PRN Mild Pain (1 - 3)  aluminum hydroxide/magnesium hydroxide/simethicone Suspension 30 milliLiter(s) Oral every 4 hours PRN Dyspepsia  melatonin 3 milliGRAM(s) Oral at bedtime PRN Insomnia  nitroglycerin     SubLingual 0.4 milliGRAM(s) SubLingual every 5 minutes PRN Chest Pain  ondansetron Injectable 4 milliGRAM(s) IV Push every 8 hours PRN Nausea and/or Vomiting  oxyCODONE    IR 5 milliGRAM(s) Oral every 4 hours PRN Moderate Pain (4 - 6)    Vital Signs Last 24 Hrs  T(C): 36.6 (22 Dec 2021 13:11), Max: 37 (22 Dec 2021 04:32)  T(F): 97.8 (22 Dec 2021 13:11), Max: 98.6 (22 Dec 2021 04:32)  HR: 78 (22 Dec 2021 13:11) (70 - 78)  BP: 148/71 (22 Dec 2021 13:11) (113/74 - 149/74)  BP(mean): 83 (22 Dec 2021 07:29) (83 - 83)  RR: 18 (22 Dec 2021 13:11) (16 - 18)  SpO2: 100% (22 Dec 2021 13:11) (95% - 100%)  I&O's Summary    ________________________________  GENERAL APPEARANCE:  No acute distress  HEAD: normocephalic, atraumatic  NECK: supple, no jugular venous distention, no carotid bruit    HEART: Regular rate and rhythm, S1, S2 normal, 1/6 murmur    CHEST:  No anterior chest wall tenderness    LUNGS:  Clear to auscultation, without any wheezing, rhonchi or rales    ABDOMEN: soft, nontender, nondistended, with positive bowel sounds appreciated  EXTREMITIES: no edema.   NEURO: Alert and oriented x3  PSYC:  Normal affect  SKIN:  Dry  ________________________________   TELEMETRY: Off tele    ECG: Sinus rhythm with nonspecific changes and nonspecific IVCD from EKG September 2021 - repeat pending.    LABS:                        11.7   18.28 )-----------( 173      ( 22 Dec 2021 06:33 )             36.0             12-22    143  |  111<H>  |  27<H>  ----------------------------<  107<H>  3.6   |  26  |  1.18    Ca    9.0      22 Dec 2021 06:33    TPro  6.4  /  Alb  3.0<L>  /  TBili  3.1<H>  /  DBili  x   /  AST  624<H>  /  ALT  641<H>  /  AlkPhos  202<H>  12-22      LIVER FUNCTIONS - ( 22 Dec 2021 06:33 )  Alb: 3.0 g/dL / Pro: 6.4 gm/dL / ALK PHOS: 202 U/L / ALT: 641 U/L / AST: 624 U/L / GGT: x         PT/INR - ( 22 Dec 2021 06:33 )   PT: 15.9 sec;   INR: 1.39 ratio         PTT - ( 22 Dec 2021 06:33 )  PTT:29.5 sec      PT/INR - ( 22 Dec 2021 06:33 )   PT: 15.9 sec;   INR: 1.39 ratio         PTT - ( 22 Dec 2021 06:33 )  PTT:29.5 sec      ________________________________    RADIOLOGY & ADDITIONAL STUDIES:   ________________________________    ASSESSMENT:  Multivessel CAD s/p CABG 2013, cath in 2019 with PCI to LCx and SVG to Diag  Peripheral arterial disease  Pancreatitis  Hypertension  History of cerebral aneurysm status post coiling    PLAN:  In summary, this is a 81y Female with a past medical history of CAD and PAD admitted for pancreatitis.  Plan for poss cholecystectomy.  Will rpt EKG, if no change - From a cardiac standpoint she can proceed with her surgery at increased risk due to her cardiac history.    Recent stress test showed no ischemia with apical thinning.  Continue aspirin.  Continue statin.  Monitor for CHF. On lasix as out pt- will monitor off for now.      __________________________________________________________________________  Thank you for allowing me to participate in the care of your patient. Please contact me should any questions arise.    MAX Roth DO, FACC

## 2021-12-22 NOTE — PROVIDER CONTACT NOTE (OTHER) - SITUATION
paged @[phone] Dr. [email or web address] spoke to Dr. Aguirre (GI dr that accepted patient from [place])  paged @[phone], returned page @[phone]

## 2021-12-22 NOTE — ED ADULT NURSE NOTE - OBJECTIVE STATEMENT
Transfer from [facility], accepting MD Aguirre (GI). Sent to  ED for for rule out pancreatitis, GI consult and possible ERCP. Patient denies any complaints, requesting to go home. IVF and Zofran given at [place] with relief of symptoms.

## 2021-12-22 NOTE — CONSULT NOTE ADULT - REASON FOR ADMISSION
transfer from [facility]
transfer from [facility]
transfer from [facility]

## 2021-12-22 NOTE — BRIEF OPERATIVE NOTE - COMMENTS
Continue abx  Contniue IVF and rescucitation for pancreatitis with LR  Outpatient stent management  Trial of clears tonight ok (I will order)

## 2021-12-22 NOTE — CONSULT NOTE ADULT - ASSESSMENT
82 yo fem former smoker, cardiac stents, iliac stents, cerebral artery aneurysm coiled, emphysema, known gallstones, cholangitis CBD stone, pancreatitis  -Patient will go for ERCP later today  -Based on patient extensive cardiac history, multiple stents and patient being told in the past to not have gallbladder surgery I would be inclined to not do gallbladder surgery during this admission unless really necessary. If surgery is done she would need to be off plavix, aspirin  -IV Abx/ IV Fluid  -Case d/w hospitalist
81 year old woman with biliary pancreatitis and cholangitis.     Plan for ERCP.   She has plavix on board (took yesterday) so will just perform stenting without sphincterotomy due to high risk of bleeding.   Will bring back for sphincterotomy as outpaitent when can be off plavix.

## 2021-12-22 NOTE — ED PROVIDER NOTE - OBJECTIVE STATEMENT
sent by [email or web address] to have consult with Dr. Aguirre for pancreatitis with choledcolithiasis and abdominal pain today 82 yo female sent by [employer] to have consult with Dr. Aguirre for pancreatitis with choledcolithiasis and abdominal pain today.

## 2021-12-22 NOTE — CONSULT NOTE ADULT - SUBJECTIVE AND OBJECTIVE BOX
bilairy pancreatitis with ongoing biliary obstruction, for ERCP, full note to follow Patient is a 81y old  Female who presents with a chief complaint of transfer from [facility] (22 Dec 2021 15:10)    81 year old woman with CAD s/p CABG and PCI's, ruptured cerebral aneurysm s/p coiling, carotid stenosis, PVD with iliac stents, HTN, HLD, admitted with biliary pancreatitis/cholangitis.     Patient states she felt well until two days before admission when she began to feel intermitted sharp RUQ pain. Day before admission had severe, 9/10, sharp RUQ pain which then turned to diffuse abdominal pain. Pain did not radiate. Associated with nausea but no vomiting. No alleviating factors other than pain meds.  No fevers or chills. No sick contacts or travel history. [place] [facility] [place] with pancreatitis and choledocholithaisis and was transferred here for ERCP.       PAST MEDICAL & SURGICAL HISTORY:  HTN (hypertension)    HLD (hyperlipidemia)    PVD (peripheral vascular disease)    SDH (subdural hematoma)    Coronary artery graft present    Cholelithiasis    S/P CABG x 3  2013    History of right-sided carotid endarterectomy  2013    History of surgery for cerebral aneurysm  s/p coiling of ruptured aneurysm, 2016    S/P coronary artery stent placement  x2 (2/2018), x1 (12/2018), x2 (2019), x1 (2019)        MEDICATIONS  (STANDING):  atorvastatin Oral Tab/Cap - Peds 10 milliGRAM(s) Oral daily  piperacillin/tazobactam IVPB.. 3.375 Gram(s) IV Intermittent every 8 hours  sodium chloride 0.9%. 1000 milliLiter(s) (50 mL/Hr) IV Continuous <Continuous>    MEDICATIONS  (PRN):  acetaminophen     Tablet .. 650 milliGRAM(s) Oral every 6 hours PRN Temp greater or equal to 38C (100.4F), Mild Pain (1 - 3)  acetaminophen     Tablet .. 650 milliGRAM(s) Oral every 4 hours PRN Mild Pain (1 - 3)  aluminum hydroxide/magnesium hydroxide/simethicone Suspension 30 milliLiter(s) Oral every 4 hours PRN Dyspepsia  melatonin 3 milliGRAM(s) Oral at bedtime PRN Insomnia  nitroglycerin     SubLingual 0.4 milliGRAM(s) SubLingual every 5 minutes PRN Chest Pain  ondansetron Injectable 4 milliGRAM(s) IV Push every 8 hours PRN Nausea and/or Vomiting  oxyCODONE    IR 5 milliGRAM(s) Oral every 4 hours PRN Moderate Pain (4 - 6)      Allergies    No Known Allergies    Intolerances        SOCIAL HISTORY:  no smoking, drinking or drugs    FAMILY HISTORY:  no colon or stomach cancer        REVIEW OF SYSTEMS:    CONSTITUTIONAL: No weakness, fevers or chills  EYES/ENT: No visual changes;  No vertigo or throat pain   NECK: No pain or stiffness  RESPIRATORY: No cough, wheezing, hemoptysis; No shortness of breath  CARDIOVASCULAR: No chest pain or palpitations  GASTROINTESTINAL: per HPI  GENITOURINARY: No dysuria, frequency or hematuria  NEUROLOGICAL: No numbness or weakness  SKIN: No itching, burning, rashes, or lesions   PSYCH: Normal mood and affect  All other review of systems is negative unless indicated above.    Vital Signs Last 24 Hrs  T(C): 36.8 (22 Dec 2021 21:27), Max: 36.9 (22 Dec 2021 11:04)  T(F): 98.2 (22 Dec 2021 21:27), Max: 98.4 (22 Dec 2021 11:04)  HR: 74 (22 Dec 2021 21:27) (70 - 110)  BP: 119/47 (22 Dec 2021 21:27) (113/74 - 187/79)  BP(mean): 83 (22 Dec 2021 07:29) (83 - 83)  RR: 18 (22 Dec 2021 21:27) (16 - 23)  SpO2: 93% (22 Dec 2021 21:27) (93% - 100%)    PHYSICAL EXAM:    Constitutional: No acute distress, elderly, non-toxic appearing  HEENT: masked, good phonation, not icteric  Neck: supple, no lymphadenopathy  Respiratory: clear to ascultation bilaterally, no wheezing  Cardiovascular: S1 and S2, regular rate and rhythm, no murmurs rubs or gallops  Gastrointestinal: soft, tender to deep palpation in all quadrants, non-distended, +bowel sounds, no rebound or guarding,   Extremities: No peripheral edema, no cyanosis or clubbing  Vascular: 2+ peripheral pulses, no venous stasis  Neurological: A/O x 3, no focal deficits, no asterixis  Psychiatric: Normal mood, normal affect  Skin: No rashes, not jaundiced    LABS:                        11.7   18.28 )-----------( 173      ( 22 Dec 2021 06:33 )             36.0     12-22    143  |  111<H>  |  27<H>  ----------------------------<  107<H>  3.6   |  26  |  1.18    Ca    9.0      22 Dec 2021 06:33    TPro  6.4  /  Alb  3.0<L>  /  TBili  3.1<H>  /  DBili  x   /  AST  624<H>  /  ALT  641<H>  /  AlkPhos  202<H>  12-22    PT/INR - ( 22 Dec 2021 06:33 )   PT: 15.9 sec;   INR: 1.39 ratio         PTT - ( 22 Dec 2021 06:33 )  PTT:29.5 sec  LIVER FUNCTIONS - ( 22 Dec 2021 06:33 )  Alb: 3.0 g/dL / Pro: 6.4 gm/dL / ALK PHOS: 202 U/L / ALT: 641 U/L / AST: 624 U/L / GGT: x             RADIOLOGY & ADDITIONAL STUDIES: stones in CBD, pancreatitis ([facility] imaging)

## 2021-12-22 NOTE — ED ADULT TRIAGE NOTE - CHIEF COMPLAINT QUOTE
Transfer from [facility], accepting MD Aguirre (GI). Sent to  ED for for rule out pancreatitis, GI consult and possible ERCP. Patient denies any complaints, requesting to go home. IVF and Zofran given at [place] with relief of symptoms.

## 2021-12-23 LAB
ALBUMIN SERPL ELPH-MCNC: 2.7 G/DL — LOW (ref 3.3–5)
ALP SERPL-CCNC: 161 U/L — HIGH (ref 40–120)
ALT FLD-CCNC: 344 U/L — HIGH (ref 12–78)
ANION GAP SERPL CALC-SCNC: 6 MMOL/L — SIGNIFICANT CHANGE UP (ref 5–17)
AST SERPL-CCNC: 180 U/L — HIGH (ref 15–37)
BILIRUB SERPL-MCNC: 1 MG/DL — SIGNIFICANT CHANGE UP (ref 0.2–1.2)
BUN SERPL-MCNC: 33 MG/DL — HIGH (ref 7–23)
CALCIUM SERPL-MCNC: 9.1 MG/DL — SIGNIFICANT CHANGE UP (ref 8.5–10.1)
CHLORIDE SERPL-SCNC: 112 MMOL/L — HIGH (ref 96–108)
CO2 SERPL-SCNC: 24 MMOL/L — SIGNIFICANT CHANGE UP (ref 22–31)
CREAT SERPL-MCNC: 1.23 MG/DL — SIGNIFICANT CHANGE UP (ref 0.5–1.3)
GLUCOSE SERPL-MCNC: 121 MG/DL — HIGH (ref 70–99)
HCT VFR BLD CALC: 33.7 % — LOW (ref 34.5–45)
HGB BLD-MCNC: 10.8 G/DL — LOW (ref 11.5–15.5)
LIDOCAIN IGE QN: 839 U/L — HIGH (ref 73–393)
MCHC RBC-ENTMCNC: 29.2 PG — SIGNIFICANT CHANGE UP (ref 27–34)
MCHC RBC-ENTMCNC: 32 GM/DL — SIGNIFICANT CHANGE UP (ref 32–36)
MCV RBC AUTO: 91.1 FL — SIGNIFICANT CHANGE UP (ref 80–100)
PLATELET # BLD AUTO: 147 K/UL — LOW (ref 150–400)
POTASSIUM SERPL-MCNC: 4.2 MMOL/L — SIGNIFICANT CHANGE UP (ref 3.5–5.3)
POTASSIUM SERPL-SCNC: 4.2 MMOL/L — SIGNIFICANT CHANGE UP (ref 3.5–5.3)
PROT SERPL-MCNC: 6 GM/DL — SIGNIFICANT CHANGE UP (ref 6–8.3)
RBC # BLD: 3.7 M/UL — LOW (ref 3.8–5.2)
RBC # FLD: 13.8 % — SIGNIFICANT CHANGE UP (ref 10.3–14.5)
SODIUM SERPL-SCNC: 142 MMOL/L — SIGNIFICANT CHANGE UP (ref 135–145)
WBC # BLD: 12.76 K/UL — HIGH (ref 3.8–10.5)
WBC # FLD AUTO: 12.76 K/UL — HIGH (ref 3.8–10.5)

## 2021-12-23 PROCEDURE — 99233 SBSQ HOSP IP/OBS HIGH 50: CPT

## 2021-12-23 RX ORDER — HEPARIN SODIUM 5000 [USP'U]/ML
5000 INJECTION INTRAVENOUS; SUBCUTANEOUS EVERY 8 HOURS
Refills: 0 | Status: DISCONTINUED | OUTPATIENT
Start: 2021-12-23 | End: 2021-12-24

## 2021-12-23 RX ORDER — ASPIRIN/CALCIUM CARB/MAGNESIUM 324 MG
81 TABLET ORAL DAILY
Refills: 0 | Status: DISCONTINUED | OUTPATIENT
Start: 2021-12-23 | End: 2021-12-25

## 2021-12-23 RX ADMIN — ATORVASTATIN CALCIUM 10 MILLIGRAM(S): 80 TABLET, FILM COATED ORAL at 22:04

## 2021-12-23 RX ADMIN — PIPERACILLIN AND TAZOBACTAM 25 GRAM(S): 4; .5 INJECTION, POWDER, LYOPHILIZED, FOR SOLUTION INTRAVENOUS at 06:36

## 2021-12-23 RX ADMIN — PIPERACILLIN AND TAZOBACTAM 25 GRAM(S): 4; .5 INJECTION, POWDER, LYOPHILIZED, FOR SOLUTION INTRAVENOUS at 22:03

## 2021-12-23 RX ADMIN — HEPARIN SODIUM 5000 UNIT(S): 5000 INJECTION INTRAVENOUS; SUBCUTANEOUS at 22:03

## 2021-12-23 RX ADMIN — ATORVASTATIN CALCIUM 10 MILLIGRAM(S): 80 TABLET, FILM COATED ORAL at 00:16

## 2021-12-23 RX ADMIN — Medication 81 MILLIGRAM(S): at 18:31

## 2021-12-23 RX ADMIN — SODIUM CHLORIDE 50 MILLILITER(S): 9 INJECTION INTRAMUSCULAR; INTRAVENOUS; SUBCUTANEOUS at 01:00

## 2021-12-23 RX ADMIN — PIPERACILLIN AND TAZOBACTAM 25 GRAM(S): 4; .5 INJECTION, POWDER, LYOPHILIZED, FOR SOLUTION INTRAVENOUS at 15:37

## 2021-12-23 NOTE — PROGRESS NOTE ADULT - SUBJECTIVE AND OBJECTIVE BOX
80 yo fem with extensive cardiac history multiple coronary, iliac stents underwent ERCP with stent for CBD stone, doing well, no abd pain, normal vital signs, Normal T estuardo, AST/ALT coming lower. Patient had been told in the past to not have gallbladder surgery due to extensive cardiac history    Abd soft, NT              Vital Signs Last 24 Hrs  T(C): 36.4 (23 Dec 2021 08:00), Max: 36.9 (22 Dec 2021 11:04)  T(F): 97.5 (23 Dec 2021 08:00), Max: 98.4 (22 Dec 2021 11:04)  HR: 68 (23 Dec 2021 08:00) (68 - 110)  BP: 152/66 (23 Dec 2021 08:00) (119/47 - 187/79)  BP(mean): --  RR: 17 (23 Dec 2021 08:00) (16 - 23)  SpO2: 98% (23 Dec 2021 08:00) (93% - 100%)                          10.8   12.76 )-----------( 147      ( 23 Dec 2021 07:27 )             33.7       12-23    142  |  112<H>  |  33<H>  ----------------------------<  121<H>  4.2   |  24  |  1.23    Ca    9.1      23 Dec 2021 07:27    TPro  6.0  /  Alb  2.7<L>  /  TBili  1.0  /  DBili  x   /  AST  180<H>  /  ALT  344<H>  /  AlkPhos  161<H>  12-23      LIVER FUNCTIONS - ( 23 Dec 2021 07:27 )  Alb: 2.7 g/dL / Pro: 6.0 gm/dL / ALK PHOS: 161 U/L / ALT: 344 U/L / AST: 180 U/L / GGT: x             MEDICATIONS  (STANDING):  atorvastatin Oral Tab/Cap - Peds 10 milliGRAM(s) Oral daily  piperacillin/tazobactam IVPB.. 3.375 Gram(s) IV Intermittent every 8 hours  sodium chloride 0.9%. 1000 milliLiter(s) (50 mL/Hr) IV Continuous <Continuous>    MEDICATIONS  (PRN):  acetaminophen     Tablet .. 650 milliGRAM(s) Oral every 6 hours PRN Temp greater or equal to 38C (100.4F), Mild Pain (1 - 3)  acetaminophen     Tablet .. 650 milliGRAM(s) Oral every 4 hours PRN Mild Pain (1 - 3)  aluminum hydroxide/magnesium hydroxide/simethicone Suspension 30 milliLiter(s) Oral every 4 hours PRN Dyspepsia  melatonin 3 milliGRAM(s) Oral at bedtime PRN Insomnia  nitroglycerin     SubLingual 0.4 milliGRAM(s) SubLingual every 5 minutes PRN Chest Pain  ondansetron Injectable 4 milliGRAM(s) IV Push every 8 hours PRN Nausea and/or Vomiting  oxyCODONE    IR 5 milliGRAM(s) Oral every 4 hours PRN Moderate Pain (4 - 6)      MEDICATIONS  (STANDING):  atorvastatin Oral Tab/Cap - Peds 10 milliGRAM(s) Oral daily  piperacillin/tazobactam IVPB.. 3.375 Gram(s) IV Intermittent every 8 hours  sodium chloride 0.9%. 1000 milliLiter(s) (50 mL/Hr) IV Continuous <Continuous>

## 2021-12-23 NOTE — PROGRESS NOTE ADULT - SUBJECTIVE AND OBJECTIVE BOX
PCP    Patient is a 81y old  Female who presents with a chief complaint of transfer from [facility] (23 Dec 2021 10:20)        HPI:  81y/oF PMH CAD s/p CABG x3 (3/2013), multiple PCI/HOLGER (most recently 12/2019), ruptured cerebral aneurysm (s/p coiling 5/2016), carotid stenosis (s/p R CEA 2013), PVD s/p iliac stents, HTN, HLD,  poss IBS transferred from [facility] for abdominal pain 2/2 to choledocholithiasis and gallstone pancreatitis. transferred from [facility] hospiotal for Planned  ercp with Dr Aguirre. Denies fevers of chills. No chest pain. + RUQ pain. WBC 18. Afebrile.LFTs: T bili: 3  Alk phso: 202  AST/ALT: 624/640  Lipase: 4200    CT abd pelvis grossly demonstrated gallstone with gallbladder wall thickening, and betty-panreatic inflammation.  Acute pancreatitis. No pseudocyst   formation. Cholelithiasis and choledocholithiasis. No secondary signs of acute cholecystitis on this modality. CBD mildly dilated to 10 mm.   RUQ US shows cholelithiasis with CBD 6mm.      12/23:  seen at bedside, feels better, no further pain, shawn po, no N/V      Review of system- Rest of the review of system are normal except mentioned in HPI      Vital Signs Last 24 Hrs  T(C): 36.4 (12-23-21 @ 08:00), Max: 36.8 (12-22-21 @ 21:27)  T(F): 97.5 (12-23-21 @ 08:00), Max: 98.2 (12-22-21 @ 21:27)  HR: 68 (12-23-21 @ 08:00) (68 - 110)  BP: 152/66 (12-23-21 @ 08:00) (119/47 - 187/79)  BP(mean): --  RR: 17 (12-23-21 @ 08:00) (16 - 23)  SpO2: 98% (12-23-21 @ 08:00) (93% - 98%)          PHYSICAL EXAM:    GENERAL: Comfortable, no acute distress   HEAD:  Normocephalic, atraumatic  EYES: EOMI, PERRLA  HEENT: Moist mucous membranes  NECK: Supple, No JVD  NERVOUS SYSTEM:  Alert & Oriented X3, Motor Strength 5/5 B/L upper and lower extremities  CHEST/LUNG: Clear to auscultation bilaterally  HEART: Regular rate and rhythm  ABDOMEN: Soft, non tender, Nondistended, Bowel sounds present  GENITOURINARY: Voiding, no palpable bladder  EXTREMITIES:   No clubbing, cyanosis, or edema  MUSCULOSKELETAL- No muscle tenderness, no joint tenderness  SKIN-no rash      LABS:                        10.8   12.76 )-----------( 147      ( 23 Dec 2021 07:27 )             33.7     12-23    142  |  112<H>  |  33<H>  ----------------------------<  121<H>  4.2   |  24  |  1.23    Ca    9.1      23 Dec 2021 07:27    TPro  6.0  /  Alb  2.7<L>  /  TBili  1.0  /  DBili  x   /  AST  180<H>  /  ALT  344<H>  /  AlkPhos  161<H>  12-23    PT/INR - ( 22 Dec 2021 06:33 )   PT: 15.9 sec;   INR: 1.39 ratio         PTT - ( 22 Dec 2021 06:33 )  PTT:29.5 sec      MEDICATIONS  (STANDING):  atorvastatin Oral Tab/Cap - Peds 10 milliGRAM(s) Oral daily  piperacillin/tazobactam IVPB.. 3.375 Gram(s) IV Intermittent every 8 hours  sodium chloride 0.9%. 1000 milliLiter(s) (50 mL/Hr) IV Continuous <Continuous>    MEDICATIONS  (PRN):  acetaminophen     Tablet .. 650 milliGRAM(s) Oral every 6 hours PRN Temp greater or equal to 38C (100.4F), Mild Pain (1 - 3)  acetaminophen     Tablet .. 650 milliGRAM(s) Oral every 4 hours PRN Mild Pain (1 - 3)  aluminum hydroxide/magnesium hydroxide/simethicone Suspension 30 milliLiter(s) Oral every 4 hours PRN Dyspepsia  melatonin 3 milliGRAM(s) Oral at bedtime PRN Insomnia  nitroglycerin     SubLingual 0.4 milliGRAM(s) SubLingual every 5 minutes PRN Chest Pain  ondansetron Injectable 4 milliGRAM(s) IV Push every 8 hours PRN Nausea and/or Vomiting  oxyCODONE    IR 5 milliGRAM(s) Oral every 4 hours PRN Moderate Pain (4 - 6)          IMP: 80 yo female with above pmh a/w:   #Gallstone pancreatitis  #Choledocholithiasis/Cholelithiasis  #cholangitis   GI Consult appreciated: S/P ERCP: pancreatis/cholelithiasis: placement of stent  surgery consult appreciated: no plans for surgery presently  -cont zosyn 3.375 iv q8h  -pain control with oxy  -IVF's  -DASH diet      #CAD/CABG/PVD/R CEA/HTN  cardiology consult appreciated  -Continue ASA - last cardiac stent 2019, hold plavix for now  -Iliac stent 2018  -Had fixed defect on recent nuclear study last month with her primary cardiologist  hold hctz for now  cons statin    #vte prophylaxis   hep sq   venodynes   amb      dispo:  likely home 1-2 days

## 2021-12-24 LAB
ALBUMIN SERPL ELPH-MCNC: 2.7 G/DL — LOW (ref 3.3–5)
ALP SERPL-CCNC: 144 U/L — HIGH (ref 40–120)
ALT FLD-CCNC: 242 U/L — HIGH (ref 12–78)
ANION GAP SERPL CALC-SCNC: 6 MMOL/L — SIGNIFICANT CHANGE UP (ref 5–17)
AST SERPL-CCNC: 69 U/L — HIGH (ref 15–37)
BILIRUB SERPL-MCNC: 0.7 MG/DL — SIGNIFICANT CHANGE UP (ref 0.2–1.2)
BUN SERPL-MCNC: 31 MG/DL — HIGH (ref 7–23)
CALCIUM SERPL-MCNC: 8.9 MG/DL — SIGNIFICANT CHANGE UP (ref 8.5–10.1)
CHLORIDE SERPL-SCNC: 111 MMOL/L — HIGH (ref 96–108)
CO2 SERPL-SCNC: 24 MMOL/L — SIGNIFICANT CHANGE UP (ref 22–31)
CREAT SERPL-MCNC: 1.09 MG/DL — SIGNIFICANT CHANGE UP (ref 0.5–1.3)
GLUCOSE SERPL-MCNC: 92 MG/DL — SIGNIFICANT CHANGE UP (ref 70–99)
HCT VFR BLD CALC: 36.7 % — SIGNIFICANT CHANGE UP (ref 34.5–45)
HGB BLD-MCNC: 11.7 G/DL — SIGNIFICANT CHANGE UP (ref 11.5–15.5)
MCHC RBC-ENTMCNC: 29.1 PG — SIGNIFICANT CHANGE UP (ref 27–34)
MCHC RBC-ENTMCNC: 31.9 GM/DL — LOW (ref 32–36)
MCV RBC AUTO: 91.3 FL — SIGNIFICANT CHANGE UP (ref 80–100)
PLATELET # BLD AUTO: 162 K/UL — SIGNIFICANT CHANGE UP (ref 150–400)
POTASSIUM SERPL-MCNC: 3.7 MMOL/L — SIGNIFICANT CHANGE UP (ref 3.5–5.3)
POTASSIUM SERPL-SCNC: 3.7 MMOL/L — SIGNIFICANT CHANGE UP (ref 3.5–5.3)
PROT SERPL-MCNC: 6.4 GM/DL — SIGNIFICANT CHANGE UP (ref 6–8.3)
RBC # BLD: 4.02 M/UL — SIGNIFICANT CHANGE UP (ref 3.8–5.2)
RBC # FLD: 13.8 % — SIGNIFICANT CHANGE UP (ref 10.3–14.5)
SODIUM SERPL-SCNC: 141 MMOL/L — SIGNIFICANT CHANGE UP (ref 135–145)
WBC # BLD: 12.35 K/UL — HIGH (ref 3.8–10.5)
WBC # FLD AUTO: 12.35 K/UL — HIGH (ref 3.8–10.5)

## 2021-12-24 PROCEDURE — 99232 SBSQ HOSP IP/OBS MODERATE 35: CPT

## 2021-12-24 PROCEDURE — 93010 ELECTROCARDIOGRAM REPORT: CPT

## 2021-12-24 RX ORDER — POTASSIUM CHLORIDE 20 MEQ
10 PACKET (EA) ORAL DAILY
Refills: 0 | Status: DISCONTINUED | OUTPATIENT
Start: 2021-12-24 | End: 2021-12-25

## 2021-12-24 RX ORDER — FUROSEMIDE 40 MG
20 TABLET ORAL DAILY
Refills: 0 | Status: DISCONTINUED | OUTPATIENT
Start: 2021-12-24 | End: 2021-12-25

## 2021-12-24 RX ORDER — CLOPIDOGREL BISULFATE 75 MG/1
75 TABLET, FILM COATED ORAL DAILY
Refills: 0 | Status: DISCONTINUED | OUTPATIENT
Start: 2021-12-24 | End: 2021-12-25

## 2021-12-24 RX ORDER — HEPARIN SODIUM 5000 [USP'U]/ML
5000 INJECTION INTRAVENOUS; SUBCUTANEOUS EVERY 12 HOURS
Refills: 0 | Status: DISCONTINUED | OUTPATIENT
Start: 2021-12-24 | End: 2021-12-25

## 2021-12-24 RX ADMIN — Medication 81 MILLIGRAM(S): at 09:16

## 2021-12-24 RX ADMIN — PIPERACILLIN AND TAZOBACTAM 25 GRAM(S): 4; .5 INJECTION, POWDER, LYOPHILIZED, FOR SOLUTION INTRAVENOUS at 05:00

## 2021-12-24 RX ADMIN — ATORVASTATIN CALCIUM 10 MILLIGRAM(S): 80 TABLET, FILM COATED ORAL at 21:15

## 2021-12-24 RX ADMIN — Medication 20 MILLIGRAM(S): at 13:46

## 2021-12-24 RX ADMIN — Medication 10 MILLIEQUIVALENT(S): at 13:46

## 2021-12-24 RX ADMIN — PIPERACILLIN AND TAZOBACTAM 25 GRAM(S): 4; .5 INJECTION, POWDER, LYOPHILIZED, FOR SOLUTION INTRAVENOUS at 21:15

## 2021-12-24 RX ADMIN — HEPARIN SODIUM 5000 UNIT(S): 5000 INJECTION INTRAVENOUS; SUBCUTANEOUS at 21:15

## 2021-12-24 RX ADMIN — PIPERACILLIN AND TAZOBACTAM 25 GRAM(S): 4; .5 INJECTION, POWDER, LYOPHILIZED, FOR SOLUTION INTRAVENOUS at 13:46

## 2021-12-24 RX ADMIN — HEPARIN SODIUM 5000 UNIT(S): 5000 INJECTION INTRAVENOUS; SUBCUTANEOUS at 05:00

## 2021-12-24 RX ADMIN — CLOPIDOGREL BISULFATE 75 MILLIGRAM(S): 75 TABLET, FILM COATED ORAL at 13:46

## 2021-12-24 NOTE — PROGRESS NOTE ADULT - SUBJECTIVE AND OBJECTIVE BOX
80 yo fem with extensive cardiac history, multiple coronary stents, CBD stone s/p ERCP, tolerating diet, no abd pain, feels "lousy" this am. LFTs trending lower    Abd soft, NT              Vital Signs Last 24 Hrs  T(C): 36.5 (24 Dec 2021 07:54), Max: 36.6 (23 Dec 2021 15:46)  T(F): 97.7 (24 Dec 2021 07:54), Max: 97.9 (23 Dec 2021 15:46)  HR: 70 (24 Dec 2021 07:54) (62 - 73)  BP: 153/61 (24 Dec 2021 07:54) (136/50 - 177/72)  BP(mean): --  RR: 19 (24 Dec 2021 07:54) (18 - 19)  SpO2: 93% (24 Dec 2021 07:54) (93% - 97%)                          11.7   12.35 )-----------( 162      ( 24 Dec 2021 07:41 )             36.7       12-24    141  |  111<H>  |  31<H>  ----------------------------<  92  3.7   |  24  |  1.09    Ca    8.9      24 Dec 2021 07:41    TPro  6.4  /  Alb  2.7<L>  /  TBili  0.7  /  DBili  x   /  AST  69<H>  /  ALT  242<H>  /  AlkPhos  144<H>  12-24      LIVER FUNCTIONS - ( 24 Dec 2021 07:41 )  Alb: 2.7 g/dL / Pro: 6.4 gm/dL / ALK PHOS: 144 U/L / ALT: 242 U/L / AST: 69 U/L / GGT: x             MEDICATIONS  (STANDING):  aspirin enteric coated 81 milliGRAM(s) Oral daily  atorvastatin Oral Tab/Cap - Peds 10 milliGRAM(s) Oral daily  heparin   Injectable 5000 Unit(s) SubCutaneous every 8 hours  piperacillin/tazobactam IVPB.. 3.375 Gram(s) IV Intermittent every 8 hours    MEDICATIONS  (PRN):  acetaminophen     Tablet .. 650 milliGRAM(s) Oral every 6 hours PRN Temp greater or equal to 38C (100.4F), Mild Pain (1 - 3)  acetaminophen     Tablet .. 650 milliGRAM(s) Oral every 4 hours PRN Mild Pain (1 - 3)  aluminum hydroxide/magnesium hydroxide/simethicone Suspension 30 milliLiter(s) Oral every 4 hours PRN Dyspepsia  melatonin 3 milliGRAM(s) Oral at bedtime PRN Insomnia  nitroglycerin     SubLingual 0.4 milliGRAM(s) SubLingual every 5 minutes PRN Chest Pain  ondansetron Injectable 4 milliGRAM(s) IV Push every 8 hours PRN Nausea and/or Vomiting  oxyCODONE    IR 5 milliGRAM(s) Oral every 4 hours PRN Moderate Pain (4 - 6)      MEDICATIONS  (STANDING):  aspirin enteric coated 81 milliGRAM(s) Oral daily  atorvastatin Oral Tab/Cap - Peds 10 milliGRAM(s) Oral daily  heparin   Injectable 5000 Unit(s) SubCutaneous every 8 hours  piperacillin/tazobactam IVPB.. 3.375 Gram(s) IV Intermittent every 8 hours

## 2021-12-24 NOTE — PROGRESS NOTE ADULT - SUBJECTIVE AND OBJECTIVE BOX
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 transferred from [facility] hospiotal for Planned  ercp with Dr Aguirre. Denies fevers of chills. No chest pain. + RUQ pain. WBC 18. Afebrile.LFTs: T bili: 3  Alk phso: 202  AST/ALT: 624/640  Lipase: 4200  CT abd pelvis grossly demonstrated gallstone with gallbladder wall thickening, and betty-panreatic inflammation.  Acute pancreatitis. No pseudocyst   formation. Cholelithiasis and choledocholithiasis. No secondary signs of acute cholecystitis on this modality. CBD mildly dilated to 10 mm.   RUQ US shows cholelithiasis with CBD 6mm.    12/23:  seen at bedside, feels better, no further pain, shawn po, no N/V  12/24 does not feel very well today, mild RUQ abd discomfort. Tolerating diet. No chest pain or SOB, but worried about her cardiac meds that's been on hold    Review of system- Rest of the review of system are normal except mentioned in HPI    Vital Signs Last 24 Hrs  T(C): 36.5 (24 Dec 2021 07:54), Max: 36.6 (23 Dec 2021 15:46)  T(F): 97.7 (24 Dec 2021 07:54), Max: 97.9 (23 Dec 2021 15:46)  HR: 70 (24 Dec 2021 07:54) (62 - 73)  BP: 153/61 (24 Dec 2021 07:54) (136/50 - 177/72)  BP(mean): --  RR: 19 (24 Dec 2021 07:54) (18 - 19)  SpO2: 93% (24 Dec 2021 07:54) (93% - 97%)    PHYSICAL EXAM:  GENERAL: Comfortable, no acute distress   HEAD:  Normocephalic, atraumatic  EYES: EOMI, PERRLA  HEENT: Moist mucous membranes  NECK: Supple, No JVD  NERVOUS SYSTEM:  Alert & Oriented X3, Motor Strength 5/5 B/L upper and lower extremities  CHEST/LUNG: Clear to auscultation bilaterally  HEART: Regular rate and rhythm  ABDOMEN: Soft, mild RUQ tenderness, Nondistended, Bowel sounds present  GENITOURINARY: Voiding, no palpable bladder  EXTREMITIES:   No clubbing, cyanosis, or edema  MUSCULOSKELETAL- No muscle tenderness, no joint tenderness  SKIN-no rash    LABS:                        11.7   12.35 )-----------( 162      ( 24 Dec 2021 07:41 )             36.7     24 Dec 2021 07:41    141    |  111    |  31     ----------------------------<  92     3.7     |  24     |  1.09     Ca    8.9        24 Dec 2021 07:41    TPro  6.4    /  Alb  2.7    /  TBili  0.7    /  DBili  x      /  AST  69     /  ALT  242    /  AlkPhos  144    24 Dec 2021 07:41    LIVER FUNCTIONS - ( 24 Dec 2021 07:41 )  Alb: 2.7 g/dL / Pro: 6.4 gm/dL / ALK PHOS: 144 U/L / ALT: 242 U/L / AST: 69 U/L / GGT: x           MEDICATIONS  (STANDING):  aspirin enteric coated 81 milliGRAM(s) Oral daily  atorvastatin Oral Tab/Cap - Peds 10 milliGRAM(s) Oral daily  clopidogrel Tablet 75 milliGRAM(s) Oral daily  furosemide    Tablet 20 milliGRAM(s) Oral daily  heparin   Injectable 5000 Unit(s) SubCutaneous every 12 hours  piperacillin/tazobactam IVPB.. 3.375 Gram(s) IV Intermittent every 8 hours  potassium chloride    Tablet ER 10 milliEquivalent(s) Oral daily    MEDICATIONS  (PRN):  acetaminophen     Tablet .. 650 milliGRAM(s) Oral every 6 hours PRN Temp greater or equal to 38C (100.4F), Mild Pain (1 - 3)  acetaminophen     Tablet .. 650 milliGRAM(s) Oral every 4 hours PRN Mild Pain (1 - 3)  aluminum hydroxide/magnesium hydroxide/simethicone Suspension 30 milliLiter(s) Oral every 4 hours PRN Dyspepsia  melatonin 3 milliGRAM(s) Oral at bedtime PRN Insomnia  nitroglycerin     SubLingual 0.4 milliGRAM(s) SubLingual every 5 minutes PRN Chest Pain  ondansetron Injectable 4 milliGRAM(s) IV Push every 8 hours PRN Nausea and/or Vomiting  oxyCODONE    IR 5 milliGRAM(s) Oral every 4 hours PRN Moderate Pain (4 - 6)    IMP: 80 yo female with above pmh a/w:   #Gallstone pancreatitis  #Choledocholithiasis/Cholelithiasis  #cholangitis   GI Consult appreciated: S/P ERCP: pancreatis/cholelithiasis: placement of stent  surgery consult appreciated: no plans for surgery presently  LFT's and lipase trending down  Mild RUQ pain, but tolerating diet  Will continue IV abx for another day and switch to PO if feels better tomorrow    #CAD/CABG/PVD/R CEA/HTN/ CHF unknown EF  cardiology consult appreciated  -Continue ASA - last cardiac stent 2019  -Iliac stent 2018  -Had fixed defect on recent nuclear study last month with her primary cardiologist  - resume Plavix- no plans for surgery now  -resume diuretics at home dose- clinically lungs are clear    #vte prophylaxis- heparin SQ    #Dispo- cont IV abx- likely switch to PO tomorrow and DC home if feels better. Trend labs in AM. AMbulate

## 2021-12-24 NOTE — PROGRESS NOTE ADULT - ASSESSMENT
80 yo fem with extensive cardiac history, multiple coronary stents, CBD stone s/p ERCP, tolerating diet, no abd pain, feels "lousy" this am. LFTs trending lower  -Patient is tolerating diet, non tender abdomen. LFts trending lower. Will not recommending gallbladder surgery during this admission. High risk cardiac patient. patient had been told to not have gallbladder surgery in the past due to her cardiac risk. Last time she had a procedure (colonoscopy?) she underwent emergent cardiac stent.  -Cont PO Abx  
82 yo fem with extensive cardiac history multiple coronary, iliac stents underwent ERCP with stent for CBD stone, doing well, no abd pain, normal vital signs, Normal T estuardo, AST/ALT coming lower. Patient had been told in the past to not have gallbladder surgery due to extensive cardiac history  -Will advance diet and see how patient does. I would rather not do gallbladder surgery unless really necessary. LFts trending lower after ERCP. I doubt cholecystitis since she doesn't have RUQ abd pain, abd is soft, non tender  -If patients needs elective gallbladder surgery she could have it done in [facility] just in case a cardiac issue arise  -Cont Abx

## 2021-12-25 ENCOUNTER — TRANSCRIPTION ENCOUNTER (OUTPATIENT)
Age: 81
End: 2021-12-25

## 2021-12-25 VITALS — DIASTOLIC BLOOD PRESSURE: 84 MMHG | SYSTOLIC BLOOD PRESSURE: 140 MMHG

## 2021-12-25 LAB
ALBUMIN SERPL ELPH-MCNC: 3.1 G/DL — LOW (ref 3.3–5)
ALP SERPL-CCNC: 142 U/L — HIGH (ref 40–120)
ALT FLD-CCNC: 175 U/L — HIGH (ref 12–78)
ANION GAP SERPL CALC-SCNC: 7 MMOL/L — SIGNIFICANT CHANGE UP (ref 5–17)
AST SERPL-CCNC: 36 U/L — SIGNIFICANT CHANGE UP (ref 15–37)
BILIRUB SERPL-MCNC: 0.7 MG/DL — SIGNIFICANT CHANGE UP (ref 0.2–1.2)
BUN SERPL-MCNC: 23 MG/DL — SIGNIFICANT CHANGE UP (ref 7–23)
CALCIUM SERPL-MCNC: 9.2 MG/DL — SIGNIFICANT CHANGE UP (ref 8.5–10.1)
CHLORIDE SERPL-SCNC: 108 MMOL/L — SIGNIFICANT CHANGE UP (ref 96–108)
CO2 SERPL-SCNC: 24 MMOL/L — SIGNIFICANT CHANGE UP (ref 22–31)
CREAT SERPL-MCNC: 0.95 MG/DL — SIGNIFICANT CHANGE UP (ref 0.5–1.3)
GLUCOSE SERPL-MCNC: 91 MG/DL — SIGNIFICANT CHANGE UP (ref 70–99)
HCT VFR BLD CALC: 41.2 % — SIGNIFICANT CHANGE UP (ref 34.5–45)
HGB BLD-MCNC: 13.4 G/DL — SIGNIFICANT CHANGE UP (ref 11.5–15.5)
LIDOCAIN IGE QN: 739 U/L — HIGH (ref 73–393)
MCHC RBC-ENTMCNC: 28.9 PG — SIGNIFICANT CHANGE UP (ref 27–34)
MCHC RBC-ENTMCNC: 32.5 GM/DL — SIGNIFICANT CHANGE UP (ref 32–36)
MCV RBC AUTO: 88.8 FL — SIGNIFICANT CHANGE UP (ref 80–100)
PLATELET # BLD AUTO: 194 K/UL — SIGNIFICANT CHANGE UP (ref 150–400)
POTASSIUM SERPL-MCNC: 3.5 MMOL/L — SIGNIFICANT CHANGE UP (ref 3.5–5.3)
POTASSIUM SERPL-SCNC: 3.5 MMOL/L — SIGNIFICANT CHANGE UP (ref 3.5–5.3)
PROT SERPL-MCNC: 7 GM/DL — SIGNIFICANT CHANGE UP (ref 6–8.3)
RBC # BLD: 4.64 M/UL — SIGNIFICANT CHANGE UP (ref 3.8–5.2)
RBC # FLD: 13.5 % — SIGNIFICANT CHANGE UP (ref 10.3–14.5)
SODIUM SERPL-SCNC: 139 MMOL/L — SIGNIFICANT CHANGE UP (ref 135–145)
WBC # BLD: 9.51 K/UL — SIGNIFICANT CHANGE UP (ref 3.8–10.5)
WBC # FLD AUTO: 9.51 K/UL — SIGNIFICANT CHANGE UP (ref 3.8–10.5)

## 2021-12-25 PROCEDURE — 99239 HOSP IP/OBS DSCHRG MGMT >30: CPT

## 2021-12-25 PROCEDURE — 93010 ELECTROCARDIOGRAM REPORT: CPT

## 2021-12-25 RX ADMIN — Medication 81 MILLIGRAM(S): at 09:52

## 2021-12-25 RX ADMIN — Medication 10 MILLIEQUIVALENT(S): at 09:51

## 2021-12-25 RX ADMIN — HEPARIN SODIUM 5000 UNIT(S): 5000 INJECTION INTRAVENOUS; SUBCUTANEOUS at 09:51

## 2021-12-25 RX ADMIN — CLOPIDOGREL BISULFATE 75 MILLIGRAM(S): 75 TABLET, FILM COATED ORAL at 09:52

## 2021-12-25 RX ADMIN — PIPERACILLIN AND TAZOBACTAM 25 GRAM(S): 4; .5 INJECTION, POWDER, LYOPHILIZED, FOR SOLUTION INTRAVENOUS at 05:24

## 2021-12-25 RX ADMIN — Medication 20 MILLIGRAM(S): at 09:52

## 2021-12-25 NOTE — DISCHARGE NOTE PROVIDER - HOSPITAL COURSE
Patient transferred from [facility] for ERCP- underwent stent placement. Seen by surgeon, high risk candidate for surgery now and managed conservatively with IV antibiotics. Leukocytosis resolved, tolerated diet. LFT's trending down. Stable for discharge on PO antibiotics for outpatient f/u

## 2021-12-25 NOTE — DISCHARGE NOTE PROVIDER - PROVIDER TOKENS
PROVIDER:[TOKEN:[[id number]],FOLLOWUP:[2 weeks]],PROVIDER:[TOKEN:[[id number]],FOLLOWUP:[1 month]]

## 2021-12-25 NOTE — DISCHARGE NOTE NURSING/CASE MANAGEMENT/SOCIAL WORK - NSDCPEFALRISK_GEN_ALL_CORE
For information on Fall & Injury Prevention, visit: [email or web address] OR  [email or web address] OR  https://www.cdc.gov/steadi/patient.html

## 2021-12-25 NOTE — PROGRESS NOTE ADULT - REASON FOR ADMISSION
transfer from [facility]
transfer from [facility]
transfer from [facility], [facility] pain
transfer from [facility]
transfer from [facility]

## 2021-12-25 NOTE — DISCHARGE NOTE PROVIDER - NSDCCPCAREPLAN_GEN_ALL_CORE_FT
PRINCIPAL DISCHARGE DIAGNOSIS  Diagnosis: Choledocholithiasis  Assessment and Plan of Treatment: S/p stent placement- complete PO abx. Outpatient f/u with DR Aguirre

## 2021-12-25 NOTE — DISCHARGE NOTE NURSING/CASE MANAGEMENT/SOCIAL WORK - PATIENT PORTAL LINK FT
You can access the FollowMyHealth Patient Portal offered by [facility] by registering at the following website: [email or web address]. By joining [employer]’s FollowMyHealth portal, you will also be able to view your health information using other applications (apps) compatible with our system.

## 2021-12-25 NOTE — PROGRESS NOTE ADULT - SUBJECTIVE AND OBJECTIVE BOX
CC-  transfer from [facility] (23 Dec 2021 10:20)    HPI:  81y/oF PMH CAD s/p CABG x3 (3/2013), multiple PCI/HOLGER (most recently 12/2019), ruptured cerebral aneurysm (s/p coiling 5/2016), carotid stenosis (s/p R CEA 2013), PVD s/p iliac stents, HTN, HLD,  poss IBS transferred from [facility] for abdominal pain 2/2 to choledocholithiasis and gallstone pancreatitis. transferred from [facility] hospiotal for Planned  ercp with Dr Aguirre. Denies fevers of chills. No chest pain. + RUQ pain. WBC 18. Afebrile.LFTs: T bili: 3  Alk phso: 202  AST/ALT: 624/640  Lipase: 4200  CT abd pelvis grossly demonstrated gallstone with gallbladder wall thickening, and betty-panreatic inflammation.  Acute pancreatitis. No pseudocyst   formation. Cholelithiasis and choledocholithiasis. No secondary signs of acute cholecystitis on this modality. CBD mildly dilated to 10 mm.   RUQ US shows cholelithiasis with CBD 6mm.    12/23:  seen at bedside, feels better, no further pain, shawn po, no N/V  12/24 does not feel very well today, mild RUQ abd discomfort. Tolerating diet. No chest pain or SOB, but worried about her cardiac meds that's been on hold  12/25    Review of system- Rest of the review of system are normal except mentioned in HPI    Vital Signs Last 24 Hrs  T(C): 36.6 (25 Dec 2021 08:07), Max: 36.6 (25 Dec 2021 08:07)  T(F): 97.9 (25 Dec 2021 08:07), Max: 97.9 (25 Dec 2021 08:07)  HR: 84 (25 Dec 2021 08:07) (74 - 92)  BP: 165/80 (25 Dec 2021 08:07) (152/85 - 165/80)  BP(mean): --  RR: 18 (25 Dec 2021 08:07) (18 - 19)  SpO2: 95% (25 Dec 2021 08:07) (95% - 97%)    PHYSICAL EXAM:  GENERAL: Comfortable, no acute distress   HEAD:  Normocephalic, atraumatic  EYES: EOMI, PERRLA  HEENT: Moist mucous membranes  NECK: Supple, No JVD  NERVOUS SYSTEM:  Alert & Oriented X3, Motor Strength 5/5 B/L upper and lower extremities  CHEST/LUNG: Clear to auscultation bilaterally  HEART: Regular rate and rhythm  ABDOMEN: Soft, mild RUQ tenderness, Nondistended, Bowel sounds present  GENITOURINARY: Voiding, no palpable bladder  EXTREMITIES:   No clubbing, cyanosis, or edema  MUSCULOSKELETAL- No muscle tenderness, no joint tenderness  SKIN-no rash    LABS:                     MEDICATIONS  (STANDING):  aspirin enteric coated 81 milliGRAM(s) Oral daily  atorvastatin Oral Tab/Cap - Peds 10 milliGRAM(s) Oral daily  clopidogrel Tablet 75 milliGRAM(s) Oral daily  furosemide    Tablet 20 milliGRAM(s) Oral daily  heparin   Injectable 5000 Unit(s) SubCutaneous every 12 hours  piperacillin/tazobactam IVPB.. 3.375 Gram(s) IV Intermittent every 8 hours  potassium chloride    Tablet ER 10 milliEquivalent(s) Oral daily    MEDICATIONS  (PRN):  acetaminophen     Tablet .. 650 milliGRAM(s) Oral every 6 hours PRN Temp greater or equal to 38C (100.4F), Mild Pain (1 - 3)  acetaminophen     Tablet .. 650 milliGRAM(s) Oral every 4 hours PRN Mild Pain (1 - 3)  aluminum hydroxide/magnesium hydroxide/simethicone Suspension 30 milliLiter(s) Oral every 4 hours PRN Dyspepsia  melatonin 3 milliGRAM(s) Oral at bedtime PRN Insomnia  nitroglycerin     SubLingual 0.4 milliGRAM(s) SubLingual every 5 minutes PRN Chest Pain  ondansetron Injectable 4 milliGRAM(s) IV Push every 8 hours PRN Nausea and/or Vomiting  oxyCODONE    IR 5 milliGRAM(s) Oral every 4 hours PRN Moderate Pain (4 - 6)    IMP: 82 yo female with above pmh a/w:   #Gallstone pancreatitis  #Choledocholithiasis/Cholelithiasis  #cholangitis   GI Consult appreciated: S/P ERCP: pancreatis/cholelithiasis: placement of stent  surgery consult appreciated: no plans for surgery presently  LFT's and lipase trending down  Mild RUQ pain, but tolerating diet  Will continue IV abx for another day and switch to PO if feels better tomorrow    #CAD/CABG/PVD/R CEA/HTN/ CHF unknown EF  cardiology consult appreciated  -Continue ASA - last cardiac stent 2019  -Iliac stent 2018  -Had fixed defect on recent nuclear study last month with her primary cardiologist  - resume Plavix- no plans for surgery now  -resume diuretics at home dose- clinically lungs are clear    #vte prophylaxis- heparin SQ    #Dispo- home today         CC-  transfer from [facility] (23 Dec 2021 10:20)    HPI:  81y/oF PMH CAD s/p CABG x3 (3/2013), multiple PCI/HOLGER (most recently 12/2019), ruptured cerebral aneurysm (s/p coiling 5/2016), carotid stenosis (s/p R CEA 2013), PVD s/p iliac stents, HTN, HLD,  poss IBS transferred from [facility] for abdominal pain 2/2 to choledocholithiasis and gallstone pancreatitis. transferred from [facility] hospiotal for Planned  ercp with Dr Aguirre. Denies fevers of chills. No chest pain. + RUQ pain. WBC 18. Afebrile.LFTs: T bili: 3  Alk phso: 202  AST/ALT: 624/640  Lipase: 4200  CT abd pelvis grossly demonstrated gallstone with gallbladder wall thickening, and betty-panreatic inflammation.  Acute pancreatitis. No pseudocyst   formation. Cholelithiasis and choledocholithiasis. No secondary signs of acute cholecystitis on this modality. CBD mildly dilated to 10 mm.   RUQ US shows cholelithiasis with CBD 6mm.    12/23:  seen at bedside, feels better, no further pain, shawn po, no N/V  12/24 does not feel very well today, mild RUQ abd discomfort. Tolerating diet. No chest pain or SOB, but worried about her cardiac meds that's been on hold  12/25 feels better today, wants to go home    Review of system- Rest of the review of system are normal except mentioned in HPI    Vital Signs Last 24 Hrs  T(C): 36.6 (25 Dec 2021 08:07), Max: 36.6 (25 Dec 2021 08:07)  T(F): 97.9 (25 Dec 2021 08:07), Max: 97.9 (25 Dec 2021 08:07)  HR: 84 (25 Dec 2021 08:07) (74 - 92)  BP: 165/80 (25 Dec 2021 08:07) (152/85 - 165/80)  BP(mean): --  RR: 18 (25 Dec 2021 08:07) (18 - 19)  SpO2: 95% (25 Dec 2021 08:07) (95% - 97%)    PHYSICAL EXAM:  GENERAL: Comfortable, no acute distress   HEAD:  Normocephalic, atraumatic  EYES: EOMI, PERRLA  HEENT: Moist mucous membranes  NECK: Supple, No JVD  NERVOUS SYSTEM:  Alert & Oriented X3, Motor Strength 5/5 B/L upper and lower extremities  CHEST/LUNG: Clear to auscultation bilaterally  HEART: Regular rate and rhythm  ABDOMEN: Soft, mild RUQ tenderness, Nondistended, Bowel sounds present  GENITOURINARY: Voiding, no palpable bladder  EXTREMITIES:   No clubbing, cyanosis, or edema  MUSCULOSKELETAL- No muscle tenderness, no joint tenderness  SKIN-no rash    LABS:                         13.4   9.51  )-----------( 194      ( 25 Dec 2021 07:58 )             41.2     25 Dec 2021 07:58    139    |  108    |  23     ----------------------------<  91     3.5     |  24     |  0.95     Ca    9.2        25 Dec 2021 07:58    TPro  7.0    /  Alb  3.1    /  TBili  0.7    /  DBili  0.3    /  AST  36     /  ALT  175    /  AlkPhos  142    25 Dec 2021 07:58    LIVER FUNCTIONS - ( 25 Dec 2021 07:58 )  Alb: 3.1 g/dL / Pro: 7.0 gm/dL / ALK PHOS: 142 U/L / ALT: 175 U/L / AST: 36 U/L / GGT: x              MEDICATIONS  (STANDING):  aspirin enteric coated 81 milliGRAM(s) Oral daily  atorvastatin Oral Tab/Cap - Peds 10 milliGRAM(s) Oral daily  clopidogrel Tablet 75 milliGRAM(s) Oral daily  furosemide    Tablet 20 milliGRAM(s) Oral daily  heparin   Injectable 5000 Unit(s) SubCutaneous every 12 hours  piperacillin/tazobactam IVPB.. 3.375 Gram(s) IV Intermittent every 8 hours  potassium chloride    Tablet ER 10 milliEquivalent(s) Oral daily    MEDICATIONS  (PRN):  acetaminophen     Tablet .. 650 milliGRAM(s) Oral every 6 hours PRN Temp greater or equal to 38C (100.4F), Mild Pain (1 - 3)  acetaminophen     Tablet .. 650 milliGRAM(s) Oral every 4 hours PRN Mild Pain (1 - 3)  aluminum hydroxide/magnesium hydroxide/simethicone Suspension 30 milliLiter(s) Oral every 4 hours PRN Dyspepsia  melatonin 3 milliGRAM(s) Oral at bedtime PRN Insomnia  nitroglycerin     SubLingual 0.4 milliGRAM(s) SubLingual every 5 minutes PRN Chest Pain  ondansetron Injectable 4 milliGRAM(s) IV Push every 8 hours PRN Nausea and/or Vomiting  oxyCODONE    IR 5 milliGRAM(s) Oral every 4 hours PRN Moderate Pain (4 - 6)    IMP: 80 yo female with above pmh a/w:   #Gallstone pancreatitis  #Acute cholecystitis w choledocholithiasis/cholangitis  GI Consult appreciated: S/P ERCP: pancreatis/cholelithiasis: placement of stent  surgery consult appreciated: no plans for surgery presently  LFT's and lipase trending down  Mild RUQ pain, but tolerating diet  Switch to PO Augmentin to complete 10 more days on discharge    #CAD/CABG/PVD/R CEA/HTN/ CHF unknown EF  cardiology consult appreciated  -Continue ASA - last cardiac stent 2019  -Iliac stent 2018  -Had fixed defect on recent nuclear study last month with her primary cardiologist  - resume Plavix- no plans for surgery now  -resume diuretics at home dose- clinically lungs are clear    #vte prophylaxis- heparin SQ    #Dispo- home today. DC time 45 mins.

## 2021-12-25 NOTE — DISCHARGE NOTE PROVIDER - CARE PROVIDER_API CALL
Antonio Aguirre)  Gastroenterology; Internal Medicine  [address] [place] [address]  Phone: [phone]  Fax: [phone]  Follow Up Time: 2 weeks    Bebeto Sol)  ColonRectal Surgery; Surgery  [address] [place] [address]  Phone: [phone]  Fax: [phone]  Follow Up Time: 1 month

## 2021-12-25 NOTE — DISCHARGE NOTE PROVIDER - NSDCMRMEDTOKEN_GEN_ALL_CORE_FT
amoxicillin-clavulanate 875 mg-125 mg oral tablet: 1 tab(s) orally 2 times a day   aspirin 81 mg oral tablet: 1 tab(s) orally once a day  atorvastatin 10 mg oral tablet: 1 tab(s) orally once a day  clopidogrel 75 mg oral tablet: 1 tab(s) orally once a day  furosemide 20 mg oral tablet: 1 tab(s) orally once a day  nitroglycerin 0.4 mg sublingual tablet: 1 tab(s) sublingual every 5 minutes, As Needed  potassium chloride 10 mEq oral tablet, extended release: 1 tab(s) orally once a day  Vitamin B-12 1000 mcg oral tablet: 1 tab(s) orally once a day  Vitamin C 500 mg oral tablet: 1 tab(s) orally once a day  Vitamin D3 2000 intl units (50 mcg) oral tablet: 1 tab(s) orally once a day

## 2022-01-03 DIAGNOSIS — K80.50 CALCULUS OF BILE DUCT WITHOUT CHOLANGITIS OR CHOLECYSTITIS WITHOUT OBSTRUCTION: ICD-10-CM

## 2022-01-03 DIAGNOSIS — I73.9 PERIPHERAL VASCULAR DISEASE, UNSPECIFIED: ICD-10-CM

## 2022-01-03 DIAGNOSIS — J43.9 EMPHYSEMA, UNSPECIFIED: ICD-10-CM

## 2022-01-03 DIAGNOSIS — Z95.1 PRESENCE OF AORTOCORONARY BYPASS GRAFT: ICD-10-CM

## 2022-01-03 DIAGNOSIS — I10 ESSENTIAL (PRIMARY) HYPERTENSION: ICD-10-CM

## 2022-01-03 DIAGNOSIS — Z87.891 PERSONAL HISTORY OF NICOTINE DEPENDENCE: ICD-10-CM

## 2022-01-03 DIAGNOSIS — E78.5 HYPERLIPIDEMIA, UNSPECIFIED: ICD-10-CM

## 2022-01-03 DIAGNOSIS — K85.10 BILIARY ACUTE PANCREATITIS WITHOUT NECROSIS OR INFECTION: ICD-10-CM

## 2022-01-26 ENCOUNTER — APPOINTMENT (OUTPATIENT)
Dept: GASTROENTEROLOGY | Facility: CLINIC | Age: 82
End: 2022-01-26
Payer: MEDICARE

## 2022-01-26 VITALS
SYSTOLIC BLOOD PRESSURE: 99 MMHG | WEIGHT: 110 LBS | HEART RATE: 68 BPM | HEIGHT: 62 IN | DIASTOLIC BLOOD PRESSURE: 68 MMHG | BODY MASS INDEX: 20.24 KG/M2

## 2022-01-26 DIAGNOSIS — Z78.9 OTHER SPECIFIED HEALTH STATUS: ICD-10-CM

## 2022-01-26 DIAGNOSIS — K83.9 DISEASE OF BILIARY TRACT, UNSPECIFIED: ICD-10-CM

## 2022-01-26 PROCEDURE — 99214 OFFICE O/P EST MOD 30 MIN: CPT

## 2022-01-26 NOTE — ASSESSMENT
[FreeTextEntry1] : 81 yaer old woman with cholangitis and biliary pancreatitis s/p ERCP (no sphincterotomy as plavix on board) and stenting, here for follow up. \par \par Patient doing great. Will book for ERCP. She will see her cardiologist for pre-procedure optimization. Will hold plavix (will await cardiology recs to allow this) but can be on aspirin. Patient will call her cardiologist to set up appointment. \par \par She will talk to her GI, Dr. Nuno, about a referral for cholecystectomy [place] a surgeon in [place].

## 2022-01-26 NOTE — HISTORY OF PRESENT ILLNESS
[de-identified] : 81 year old woman with both cholangitis and biliary pancreatitis, s/p ERCP and stenting, no sphincterotmy due to plavix, here for follow up. \par \par Patient has been doing very well without any acute complaints. No abdominal pain. No overt signs of GI bleeding like hematochezia, hematemesis, melena. Good appetite. No nausea or vomiting. No jaundice. Urine normal color.

## 2022-02-23 ENCOUNTER — OUTPATIENT (OUTPATIENT)
Dept: OUTPATIENT SERVICES | Facility: HOSPITAL | Age: 82
LOS: 1 days | End: 2022-02-23
Payer: MEDICARE

## 2022-02-23 VITALS
RESPIRATION RATE: 16 BRPM | SYSTOLIC BLOOD PRESSURE: 148 MMHG | HEART RATE: 79 BPM | DIASTOLIC BLOOD PRESSURE: 67 MMHG | WEIGHT: 113.98 LBS | TEMPERATURE: 99 F | HEIGHT: 62 IN

## 2022-02-23 DIAGNOSIS — Z98.890 OTHER SPECIFIED POSTPROCEDURAL STATES: Chronic | ICD-10-CM

## 2022-02-23 DIAGNOSIS — Z95.5 PRESENCE OF CORONARY ANGIOPLASTY IMPLANT AND GRAFT: Chronic | ICD-10-CM

## 2022-02-23 DIAGNOSIS — Z95.1 PRESENCE OF AORTOCORONARY BYPASS GRAFT: Chronic | ICD-10-CM

## 2022-02-23 DIAGNOSIS — K80.20 CALCULUS OF GALLBLADDER WITHOUT CHOLECYSTITIS WITHOUT OBSTRUCTION: ICD-10-CM

## 2022-02-23 DIAGNOSIS — Z45.82 ENCOUNTER FOR ADJUSTMENT OR REMOVAL OF MYRINGOTOMY DEVICE (STENT) (TUBE): ICD-10-CM

## 2022-02-23 DIAGNOSIS — Z95.5 PRESENCE OF CORONARY ANGIOPLASTY IMPLANT AND GRAFT: ICD-10-CM

## 2022-02-23 DIAGNOSIS — Z01.818 ENCOUNTER FOR OTHER PREPROCEDURAL EXAMINATION: ICD-10-CM

## 2022-02-23 LAB
ANION GAP SERPL CALC-SCNC: 8 MMOL/L — SIGNIFICANT CHANGE UP (ref 5–17)
APTT BLD: 33.2 SEC — SIGNIFICANT CHANGE UP (ref 27.5–35.5)
BASOPHILS # BLD AUTO: 0.08 K/UL — SIGNIFICANT CHANGE UP (ref 0–0.2)
BASOPHILS NFR BLD AUTO: 0.8 % — SIGNIFICANT CHANGE UP (ref 0–2)
BUN SERPL-MCNC: 22 MG/DL — SIGNIFICANT CHANGE UP (ref 7–23)
CALCIUM SERPL-MCNC: 9.5 MG/DL — SIGNIFICANT CHANGE UP (ref 8.5–10.1)
CHLORIDE SERPL-SCNC: 108 MMOL/L — SIGNIFICANT CHANGE UP (ref 96–108)
CO2 SERPL-SCNC: 26 MMOL/L — SIGNIFICANT CHANGE UP (ref 22–31)
CREAT SERPL-MCNC: 0.99 MG/DL — SIGNIFICANT CHANGE UP (ref 0.5–1.3)
EOSINOPHIL # BLD AUTO: 0.24 K/UL — SIGNIFICANT CHANGE UP (ref 0–0.5)
EOSINOPHIL NFR BLD AUTO: 2.4 % — SIGNIFICANT CHANGE UP (ref 0–6)
GLUCOSE SERPL-MCNC: 83 MG/DL — SIGNIFICANT CHANGE UP (ref 70–99)
HCT VFR BLD CALC: 41.2 % — SIGNIFICANT CHANGE UP (ref 34.5–45)
HGB BLD-MCNC: 13.3 G/DL — SIGNIFICANT CHANGE UP (ref 11.5–15.5)
IMM GRANULOCYTES NFR BLD AUTO: 0.4 % — SIGNIFICANT CHANGE UP (ref 0–1.5)
INR BLD: 1.13 RATIO — SIGNIFICANT CHANGE UP (ref 0.88–1.16)
LYMPHOCYTES # BLD AUTO: 2.17 K/UL — SIGNIFICANT CHANGE UP (ref 1–3.3)
LYMPHOCYTES # BLD AUTO: 21.3 % — SIGNIFICANT CHANGE UP (ref 13–44)
MCHC RBC-ENTMCNC: 29.6 PG — SIGNIFICANT CHANGE UP (ref 27–34)
MCHC RBC-ENTMCNC: 32.3 GM/DL — SIGNIFICANT CHANGE UP (ref 32–36)
MCV RBC AUTO: 91.6 FL — SIGNIFICANT CHANGE UP (ref 80–100)
MONOCYTES # BLD AUTO: 0.99 K/UL — HIGH (ref 0–0.9)
MONOCYTES NFR BLD AUTO: 9.7 % — SIGNIFICANT CHANGE UP (ref 2–14)
NEUTROPHILS # BLD AUTO: 6.69 K/UL — SIGNIFICANT CHANGE UP (ref 1.8–7.4)
NEUTROPHILS NFR BLD AUTO: 65.4 % — SIGNIFICANT CHANGE UP (ref 43–77)
PLATELET # BLD AUTO: 201 K/UL — SIGNIFICANT CHANGE UP (ref 150–400)
POTASSIUM SERPL-MCNC: 3.9 MMOL/L — SIGNIFICANT CHANGE UP (ref 3.5–5.3)
POTASSIUM SERPL-SCNC: 3.9 MMOL/L — SIGNIFICANT CHANGE UP (ref 3.5–5.3)
PROTHROM AB SERPL-ACNC: 13.1 SEC — SIGNIFICANT CHANGE UP (ref 10.5–13.4)
RBC # BLD: 4.5 M/UL — SIGNIFICANT CHANGE UP (ref 3.8–5.2)
RBC # FLD: 13 % — SIGNIFICANT CHANGE UP (ref 10.3–14.5)
SODIUM SERPL-SCNC: 142 MMOL/L — SIGNIFICANT CHANGE UP (ref 135–145)
WBC # BLD: 10.21 K/UL — SIGNIFICANT CHANGE UP (ref 3.8–10.5)
WBC # FLD AUTO: 10.21 K/UL — SIGNIFICANT CHANGE UP (ref 3.8–10.5)

## 2022-02-23 PROCEDURE — 93005 ELECTROCARDIOGRAM TRACING: CPT

## 2022-02-23 PROCEDURE — 85610 PROTHROMBIN TIME: CPT

## 2022-02-23 PROCEDURE — 93010 ELECTROCARDIOGRAM REPORT: CPT

## 2022-02-23 PROCEDURE — G0463: CPT | Mod: 25

## 2022-02-23 PROCEDURE — 85025 COMPLETE CBC W/AUTO DIFF WBC: CPT

## 2022-02-23 PROCEDURE — 85730 THROMBOPLASTIN TIME PARTIAL: CPT

## 2022-02-23 PROCEDURE — 36415 COLL VENOUS BLD VENIPUNCTURE: CPT

## 2022-02-23 PROCEDURE — 80048 BASIC METABOLIC PNL TOTAL CA: CPT

## 2022-02-23 RX ORDER — ASPIRIN/CALCIUM CARB/MAGNESIUM 324 MG
1 TABLET ORAL
Qty: 0 | Refills: 0 | DISCHARGE

## 2022-02-23 NOTE — H&P PST ADULT - PROBLEM SELECTOR PLAN 2
Cardiac consult scheduled pending reports  Plavix as per cardiology last dose 2/23/22.   Patient verbalized understanding.

## 2022-02-23 NOTE — H&P PST ADULT - BP NONINVASIVE MEAN (MM HG)
94 Topical Clindamycin Counseling: Patient counseled that this medication may cause skin irritation or allergic reactions.  In the event of skin irritation, the patient was advised to reduce the amount of the drug applied or use it less frequently.   The patient verbalized understanding of the proper use and possible adverse effects of clindamycin.  All of the patient's questions and concerns were addressed.

## 2022-02-23 NOTE — H&P PST ADULT - HISTORY OF PRESENT ILLNESS
80 y/o female presents to [facility] for scheduled ERCP and stent removal on 2/28/22. Patient reports abdominal pain 12/2021 seen in the ED and admitted for gallstone stent placed.

## 2022-02-23 NOTE — H&P PST ADULT - PROBLEM SELECTOR PLAN 1
1. Expect a call from Endoscopy the day before your procedure between 11am and 3pm.  2. Follow the GI doctor's instructions for preparation  and day before procedure activities.  3. Follow the GI doctor's  instructions for medications.   4. Covid swab scheduled for 2/25/22

## 2022-02-23 NOTE — H&P PST ADULT - NSICDXPASTSURGICALHX_GEN_ALL_CORE_FT
PAST SURGICAL HISTORY:  History of biliary stent insertion     History of right-sided carotid endarterectomy 2013    History of surgery for cerebral aneurysm s/p coiling of ruptured aneurysm, 2016    S/P CABG x 3 2013    S/P coronary artery stent placement x2 (2/2018), x1 (12/2018), x2 (2019), x1 (2019)

## 2022-02-23 NOTE — H&P PST ADULT - ASSESSMENT
80 y/o female presents to [facility] for scheduled ERCP and stent removal on 2/28/22.

## 2022-02-24 DIAGNOSIS — Z01.818 ENCOUNTER FOR OTHER PREPROCEDURAL EXAMINATION: ICD-10-CM

## 2022-02-24 DIAGNOSIS — Z45.82 ENCOUNTER FOR ADJUSTMENT OR REMOVAL OF MYRINGOTOMY DEVICE (STENT) (TUBE): ICD-10-CM

## 2022-02-28 ENCOUNTER — OUTPATIENT (OUTPATIENT)
Dept: INPATIENT UNIT | Facility: HOSPITAL | Age: 82
LOS: 1 days | Discharge: HOME | End: 2022-02-28
Payer: MEDICARE

## 2022-02-28 ENCOUNTER — APPOINTMENT (OUTPATIENT)
Dept: GASTROENTEROLOGY | Facility: HOSPITAL | Age: 82
End: 2022-02-28
Payer: MEDICARE

## 2022-02-28 VITALS
WEIGHT: 113.98 LBS | SYSTOLIC BLOOD PRESSURE: 153 MMHG | RESPIRATION RATE: 15 BRPM | HEIGHT: 62 IN | OXYGEN SATURATION: 99 % | HEART RATE: 78 BPM | DIASTOLIC BLOOD PRESSURE: 92 MMHG | TEMPERATURE: 97 F

## 2022-02-28 VITALS
DIASTOLIC BLOOD PRESSURE: 54 MMHG | SYSTOLIC BLOOD PRESSURE: 117 MMHG | RESPIRATION RATE: 14 BRPM | OXYGEN SATURATION: 98 % | HEART RATE: 63 BPM | TEMPERATURE: 98 F

## 2022-02-28 DIAGNOSIS — Z95.5 PRESENCE OF CORONARY ANGIOPLASTY IMPLANT AND GRAFT: Chronic | ICD-10-CM

## 2022-02-28 DIAGNOSIS — Z98.890 OTHER SPECIFIED POSTPROCEDURAL STATES: Chronic | ICD-10-CM

## 2022-02-28 DIAGNOSIS — Z45.82 ENCOUNTER FOR ADJUSTMENT OR REMOVAL OF MYRINGOTOMY DEVICE (STENT) (TUBE): ICD-10-CM

## 2022-02-28 DIAGNOSIS — Z95.1 PRESENCE OF AORTOCORONARY BYPASS GRAFT: Chronic | ICD-10-CM

## 2022-02-28 PROCEDURE — C1889: CPT

## 2022-02-28 PROCEDURE — 43262 ENDO CHOLANGIOPANCREATOGRAPH: CPT

## 2022-02-28 PROCEDURE — 43275 ERCP REMOVE FORGN BODY DUCT: CPT

## 2022-02-28 PROCEDURE — 76000 FLUOROSCOPY <1 HR PHYS/QHP: CPT

## 2022-02-28 PROCEDURE — C1769: CPT

## 2022-02-28 PROCEDURE — 43264 ERCP REMOVE DUCT CALCULI: CPT

## 2022-02-28 RX ORDER — ASPIRIN/CALCIUM CARB/MAGNESIUM 324 MG
1 TABLET ORAL
Qty: 0 | Refills: 0 | DISCHARGE

## 2022-02-28 RX ORDER — PROCHLORPERAZINE MALEATE 5 MG
10 TABLET ORAL ONCE
Refills: 0 | Status: COMPLETED | OUTPATIENT
Start: 2022-02-28 | End: 2022-02-28

## 2022-02-28 RX ORDER — OXYCODONE HYDROCHLORIDE 5 MG/1
5 TABLET ORAL ONCE
Refills: 0 | Status: DISCONTINUED | OUTPATIENT
Start: 2022-02-28 | End: 2022-02-28

## 2022-02-28 RX ORDER — CLOPIDOGREL BISULFATE 75 MG/1
1 TABLET, FILM COATED ORAL
Qty: 0 | Refills: 0 | DISCHARGE

## 2022-02-28 RX ORDER — OXYCODONE HYDROCHLORIDE 5 MG/1
10 TABLET ORAL ONCE
Refills: 0 | Status: DISCONTINUED | OUTPATIENT
Start: 2022-02-28 | End: 2022-02-28

## 2022-02-28 RX ORDER — SODIUM CHLORIDE 9 MG/ML
1000 INJECTION, SOLUTION INTRAVENOUS
Refills: 0 | Status: DISCONTINUED | OUTPATIENT
Start: 2022-02-28 | End: 2022-02-28

## 2022-02-28 RX ORDER — NITROGLYCERIN 6.5 MG
1 CAPSULE, EXTENDED RELEASE ORAL
Qty: 0 | Refills: 0 | DISCHARGE

## 2022-02-28 RX ORDER — ONDANSETRON 8 MG/1
4 TABLET, FILM COATED ORAL ONCE
Refills: 0 | Status: DISCONTINUED | OUTPATIENT
Start: 2022-02-28 | End: 2022-02-28

## 2022-02-28 RX ORDER — SODIUM CHLORIDE 9 MG/ML
1000 INJECTION, SOLUTION INTRAVENOUS ONCE
Refills: 0 | Status: COMPLETED | OUTPATIENT
Start: 2022-02-28 | End: 2022-02-28

## 2022-02-28 RX ORDER — CALCIUM CARBONATE 500(1250)
1 TABLET ORAL
Qty: 0 | Refills: 0 | DISCHARGE

## 2022-02-28 RX ORDER — LOSARTAN/HYDROCHLOROTHIAZIDE 100MG-25MG
1 TABLET ORAL
Qty: 0 | Refills: 0 | DISCHARGE

## 2022-02-28 RX ORDER — HYDRALAZINE HCL 50 MG
10 TABLET ORAL ONCE
Refills: 0 | Status: COMPLETED | OUTPATIENT
Start: 2022-02-28 | End: 2022-02-28

## 2022-02-28 RX ORDER — FUROSEMIDE 40 MG
1 TABLET ORAL
Qty: 0 | Refills: 0 | DISCHARGE

## 2022-02-28 RX ORDER — CIPROFLOXACIN HYDROCHLORIDE 250 MG/1
250 TABLET, FILM COATED ORAL
Qty: 20 | Refills: 0 | Status: ACTIVE | COMMUNITY
Start: 2022-02-28 | End: 1900-01-01

## 2022-02-28 RX ORDER — LABETALOL HCL 100 MG
5 TABLET ORAL ONCE
Refills: 0 | Status: COMPLETED | OUTPATIENT
Start: 2022-02-28 | End: 2022-02-28

## 2022-02-28 RX ORDER — PREGABALIN 225 MG/1
1 CAPSULE ORAL
Qty: 0 | Refills: 0 | DISCHARGE

## 2022-02-28 RX ORDER — CHOLECALCIFEROL (VITAMIN D3) 125 MCG
1 CAPSULE ORAL
Qty: 0 | Refills: 0 | DISCHARGE

## 2022-02-28 RX ORDER — ASCORBIC ACID 60 MG
1 TABLET,CHEWABLE ORAL
Qty: 0 | Refills: 0 | DISCHARGE

## 2022-02-28 RX ORDER — ATORVASTATIN CALCIUM 80 MG/1
1 TABLET, FILM COATED ORAL
Qty: 0 | Refills: 0 | DISCHARGE

## 2022-02-28 RX ORDER — FENTANYL CITRATE 50 UG/ML
50 INJECTION INTRAVENOUS
Refills: 0 | Status: DISCONTINUED | OUTPATIENT
Start: 2022-02-28 | End: 2022-02-28

## 2022-02-28 RX ORDER — POTASSIUM CHLORIDE 20 MEQ
1 PACKET (EA) ORAL
Qty: 0 | Refills: 0 | DISCHARGE

## 2022-02-28 RX ADMIN — SODIUM CHLORIDE 1000 MILLILITER(S): 9 INJECTION, SOLUTION INTRAVENOUS at 07:16

## 2022-02-28 RX ADMIN — Medication 10 MILLIGRAM(S): at 11:57

## 2022-02-28 RX ADMIN — Medication 5 MILLIGRAM(S): at 10:55

## 2022-02-28 RX ADMIN — Medication 10 MILLIGRAM(S): at 11:30

## 2022-02-28 NOTE — ASU DISCHARGE PLAN (ADULT/PEDIATRIC) - NS MD DC FALL RISK RISK
For information on Fall & Injury Prevention, visit: [email or web address] OR  [email or web address] OR  https://www.cdc.gov/steadi/patient.html

## 2022-02-28 NOTE — ASU PATIENT PROFILE, ADULT - FALL HARM RISK - UNIVERSAL INTERVENTIONS
Bed in lowest position, wheels locked, appropriate side rails in place/Call bell, personal items and telephone in reach/Instruct patient to call for assistance before getting out of bed or chair/Non-slip footwear when patient is out of bed/[place] to call system/Physically safe environment - no spills, clutter or unnecessary equipment/Purposeful Proactive Rounding/Room/bathroom lighting operational, light cord in reach

## 2022-02-28 NOTE — ASU PREOP CHECKLIST - BSA (M2)
Writer called [facility] and talked to Cathy from lab . She states that the RN for pt's PCP CA the lab apt for the pt. Writer informed Cathy that pt needs to do the CBC and CMP from Dr. Schmitt due to taking MTX- a high risk med. That can affect her liver and kidney and pt has to be monitor closely. Cathy told the writer that pt can just walk in to the lab- no apt needed and perform the tests from Dr. Schmitt. Attempted to call pt, LDM with this information and advised to call back if has any further questions.   1.51

## 2022-03-04 DIAGNOSIS — Z79.02 LONG TERM (CURRENT) USE OF ANTITHROMBOTICS/ANTIPLATELETS: ICD-10-CM

## 2022-03-04 DIAGNOSIS — K85.80 OTHER ACUTE PANCREATITIS WITHOUT NECROSIS OR INFECTION: ICD-10-CM

## 2022-03-04 DIAGNOSIS — Z79.899 OTHER LONG TERM (CURRENT) DRUG THERAPY: ICD-10-CM

## 2022-03-04 DIAGNOSIS — I25.10 ATHEROSCLEROTIC HEART DISEASE OF NATIVE CORONARY ARTERY WITHOUT ANGINA PECTORIS: ICD-10-CM

## 2022-03-04 DIAGNOSIS — Z95.1 PRESENCE OF AORTOCORONARY BYPASS GRAFT: ICD-10-CM

## 2022-03-04 DIAGNOSIS — I10 ESSENTIAL (PRIMARY) HYPERTENSION: ICD-10-CM

## 2022-03-04 DIAGNOSIS — Z79.82 LONG TERM (CURRENT) USE OF ASPIRIN: ICD-10-CM

## 2022-03-04 DIAGNOSIS — I73.9 PERIPHERAL VASCULAR DISEASE, UNSPECIFIED: ICD-10-CM

## 2022-03-04 DIAGNOSIS — Z95.5 PRESENCE OF CORONARY ANGIOPLASTY IMPLANT AND GRAFT: ICD-10-CM

## 2022-03-04 DIAGNOSIS — Z87.891 PERSONAL HISTORY OF NICOTINE DEPENDENCE: ICD-10-CM

## 2022-03-04 DIAGNOSIS — Z95.820 PERIPHERAL VASCULAR ANGIOPLASTY STATUS WITH IMPLANTS AND GRAFTS: ICD-10-CM

## 2022-03-07 ENCOUNTER — OUTPATIENT (OUTPATIENT)
Dept: OUTPATIENT SERVICES | Facility: HOSPITAL | Age: 82
LOS: 1 days | End: 2022-03-07

## 2022-03-07 DIAGNOSIS — Z98.890 OTHER SPECIFIED POSTPROCEDURAL STATES: Chronic | ICD-10-CM

## 2022-03-07 DIAGNOSIS — R53.83 OTHER FATIGUE: ICD-10-CM

## 2022-03-07 DIAGNOSIS — Z95.5 PRESENCE OF CORONARY ANGIOPLASTY IMPLANT AND GRAFT: Chronic | ICD-10-CM

## 2022-03-07 DIAGNOSIS — Z95.1 PRESENCE OF AORTOCORONARY BYPASS GRAFT: Chronic | ICD-10-CM

## 2022-03-15 ENCOUNTER — NON-APPOINTMENT (OUTPATIENT)
Age: 82
End: 2022-03-15

## 2022-03-15 PROCEDURE — 76705 ECHO EXAM OF ABDOMEN: CPT | Mod: 26

## 2022-03-15 PROCEDURE — 71250 CT THORAX DX C-: CPT | Mod: 26

## 2022-03-15 PROCEDURE — 93010 ELECTROCARDIOGRAM REPORT: CPT

## 2022-03-15 PROCEDURE — 99285 EMERGENCY DEPT VISIT HI MDM: CPT

## 2022-03-15 PROCEDURE — 71045 X-RAY EXAM CHEST 1 VIEW: CPT | Mod: 26

## 2022-03-16 ENCOUNTER — OUTPATIENT (OUTPATIENT)
Dept: OUTPATIENT SERVICES | Facility: HOSPITAL | Age: 82
LOS: 1 days | End: 2022-03-16

## 2022-03-16 ENCOUNTER — INPATIENT (INPATIENT)
Facility: HOSPITAL | Age: 82
LOS: 0 days | Discharge: HOME | End: 2022-03-17
Payer: MEDICARE

## 2022-03-16 DIAGNOSIS — Z95.1 PRESENCE OF AORTOCORONARY BYPASS GRAFT: Chronic | ICD-10-CM

## 2022-03-16 DIAGNOSIS — Z98.890 OTHER SPECIFIED POSTPROCEDURAL STATES: Chronic | ICD-10-CM

## 2022-03-16 DIAGNOSIS — Z95.5 PRESENCE OF CORONARY ANGIOPLASTY IMPLANT AND GRAFT: Chronic | ICD-10-CM

## 2022-03-16 PROCEDURE — 99223 1ST HOSP IP/OBS HIGH 75: CPT

## 2022-03-16 PROCEDURE — 93010 ELECTROCARDIOGRAM REPORT: CPT | Mod: 76

## 2022-03-16 PROCEDURE — 93459 L HRT ART/GRFT ANGIO: CPT | Mod: 26

## 2022-03-17 ENCOUNTER — OUTPATIENT (OUTPATIENT)
Dept: OUTPATIENT SERVICES | Facility: HOSPITAL | Age: 82
LOS: 1 days | End: 2022-03-17

## 2022-03-17 DIAGNOSIS — Z98.890 OTHER SPECIFIED POSTPROCEDURAL STATES: Chronic | ICD-10-CM

## 2022-03-17 DIAGNOSIS — Z95.5 PRESENCE OF CORONARY ANGIOPLASTY IMPLANT AND GRAFT: Chronic | ICD-10-CM

## 2022-03-17 DIAGNOSIS — Z95.1 PRESENCE OF AORTOCORONARY BYPASS GRAFT: Chronic | ICD-10-CM

## 2022-03-21 ENCOUNTER — NON-APPOINTMENT (OUTPATIENT)
Age: 82
End: 2022-03-21

## 2022-03-21 ENCOUNTER — APPOINTMENT (OUTPATIENT)
Dept: OPHTHALMOLOGY | Facility: CLINIC | Age: 82
End: 2022-03-21
Payer: MEDICARE

## 2022-03-21 PROCEDURE — 92014 COMPRE OPH EXAM EST PT 1/>: CPT

## 2022-03-21 PROCEDURE — 92134 CPTRZ OPH DX IMG PST SGM RTA: CPT

## 2022-03-24 DIAGNOSIS — D72.829 ELEVATED WHITE BLOOD CELL COUNT, UNSPECIFIED: ICD-10-CM

## 2022-03-24 DIAGNOSIS — R07.9 CHEST PAIN, UNSPECIFIED: ICD-10-CM

## 2022-03-24 DIAGNOSIS — Z79.82 LONG TERM (CURRENT) USE OF ASPIRIN: ICD-10-CM

## 2022-03-24 DIAGNOSIS — I25.2 OLD MYOCARDIAL INFARCTION: ICD-10-CM

## 2022-03-24 DIAGNOSIS — I10 ESSENTIAL (PRIMARY) HYPERTENSION: ICD-10-CM

## 2022-03-24 DIAGNOSIS — Z95.5 PRESENCE OF CORONARY ANGIOPLASTY IMPLANT AND GRAFT: ICD-10-CM

## 2022-03-24 DIAGNOSIS — Z79.02 LONG TERM (CURRENT) USE OF ANTITHROMBOTICS/ANTIPLATELETS: ICD-10-CM

## 2022-03-24 DIAGNOSIS — K80.20 CALCULUS OF GALLBLADDER WITHOUT CHOLECYSTITIS WITHOUT OBSTRUCTION: ICD-10-CM

## 2022-03-24 DIAGNOSIS — Z87.891 PERSONAL HISTORY OF NICOTINE DEPENDENCE: ICD-10-CM

## 2022-03-24 DIAGNOSIS — I73.9 PERIPHERAL VASCULAR DISEASE, UNSPECIFIED: ICD-10-CM

## 2022-03-24 DIAGNOSIS — I47.2 VENTRICULAR TACHYCARDIA: ICD-10-CM

## 2022-03-24 DIAGNOSIS — Z20.822 CONTACT WITH AND (SUSPECTED) EXPOSURE TO COVID-19: ICD-10-CM

## 2022-03-24 DIAGNOSIS — I25.119 ATHEROSCLEROTIC HEART DISEASE OF NATIVE CORONARY ARTERY WITH UNSPECIFIED ANGINA PECTORIS: ICD-10-CM

## 2022-03-24 DIAGNOSIS — E78.5 HYPERLIPIDEMIA, UNSPECIFIED: ICD-10-CM

## 2022-03-24 DIAGNOSIS — Z95.1 PRESENCE OF AORTOCORONARY BYPASS GRAFT: ICD-10-CM

## 2022-03-24 DIAGNOSIS — Z95.820 PERIPHERAL VASCULAR ANGIOPLASTY STATUS WITH IMPLANTS AND GRAFTS: ICD-10-CM

## 2022-03-24 DIAGNOSIS — I25.799 ATHEROSCLEROSIS OF OTHER CORONARY ARTERY BYPASS GRAFT(S) WITH UNSPECIFIED ANGINA PECTORIS: ICD-10-CM

## 2022-03-26 ENCOUNTER — EMERGENCY (EMERGENCY)
Facility: HOSPITAL | Age: 82
LOS: 1 days | Discharge: HOME | End: 2022-03-26
Admitting: EMERGENCY MEDICINE
Payer: MEDICARE

## 2022-03-26 DIAGNOSIS — D72.829 ELEVATED WHITE BLOOD CELL COUNT, UNSPECIFIED: ICD-10-CM

## 2022-03-26 DIAGNOSIS — U07.1 COVID-19: ICD-10-CM

## 2022-03-26 DIAGNOSIS — Z98.890 OTHER SPECIFIED POSTPROCEDURAL STATES: Chronic | ICD-10-CM

## 2022-03-26 DIAGNOSIS — I73.9 PERIPHERAL VASCULAR DISEASE, UNSPECIFIED: ICD-10-CM

## 2022-03-26 DIAGNOSIS — R55 SYNCOPE AND COLLAPSE: ICD-10-CM

## 2022-03-26 DIAGNOSIS — Z95.5 PRESENCE OF CORONARY ANGIOPLASTY IMPLANT AND GRAFT: Chronic | ICD-10-CM

## 2022-03-26 DIAGNOSIS — Z87.891 PERSONAL HISTORY OF NICOTINE DEPENDENCE: ICD-10-CM

## 2022-03-26 DIAGNOSIS — S01.81XA LACERATION WITHOUT FOREIGN BODY OF OTHER PART OF HEAD, INITIAL ENCOUNTER: ICD-10-CM
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
## 2022-03-26 DIAGNOSIS — R51.9 HEADACHE, UNSPECIFIED: ICD-10-CM

## 2022-03-26 DIAGNOSIS — Z79.02 LONG TERM (CURRENT) USE OF ANTITHROMBOTICS/ANTIPLATELETS: ICD-10-CM
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
## 2022-03-26 DIAGNOSIS — I10 ESSENTIAL (PRIMARY) HYPERTENSION: ICD-10-CM

## 2022-03-26 DIAGNOSIS — Z95.1 PRESENCE OF AORTOCORONARY BYPASS GRAFT: Chronic | ICD-10-CM

## 2022-03-26 DIAGNOSIS — Z95.5 PRESENCE OF CORONARY ANGIOPLASTY IMPLANT AND GRAFT: ICD-10-CM
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
## 2022-03-26 DIAGNOSIS — I25.10 ATHEROSCLEROTIC HEART DISEASE OF NATIVE CORONARY ARTERY WITHOUT ANGINA PECTORIS: ICD-10-CM

## 2022-03-26 PROCEDURE — 71045 X-RAY EXAM CHEST 1 VIEW: CPT | Mod: 26

## 2022-03-26 PROCEDURE — 93010 ELECTROCARDIOGRAM REPORT: CPT

## 2022-03-26 PROCEDURE — 72170 X-RAY EXAM OF PELVIS: CPT | Mod: 26

## 2022-03-26 PROCEDURE — 72125 CT NECK SPINE W/O DYE: CPT | Mod: 26

## 2022-03-26 PROCEDURE — 99285 EMERGENCY DEPT VISIT HI MDM: CPT | Mod: CS

## 2022-03-26 PROCEDURE — 70450 CT HEAD/BRAIN W/O DYE: CPT | Mod: 26

## 2022-03-27 ENCOUNTER — OUTPATIENT (OUTPATIENT)
Dept: OUTPATIENT SERVICES | Facility: HOSPITAL | Age: 82
LOS: 1 days | End: 2022-03-27

## 2022-03-27 DIAGNOSIS — Z98.890 OTHER SPECIFIED POSTPROCEDURAL STATES: Chronic | ICD-10-CM

## 2022-03-27 DIAGNOSIS — Z95.1 PRESENCE OF AORTOCORONARY BYPASS GRAFT: Chronic | ICD-10-CM

## 2022-03-27 DIAGNOSIS — Z95.5 PRESENCE OF CORONARY ANGIOPLASTY IMPLANT AND GRAFT: Chronic | ICD-10-CM

## 2022-03-27 PROCEDURE — 73030 X-RAY EXAM OF SHOULDER: CPT | Mod: 26,LT

## 2022-03-27 PROCEDURE — 73060 X-RAY EXAM OF HUMERUS: CPT | Mod: 26,LT

## 2022-03-27 PROCEDURE — 93880 EXTRACRANIAL BILAT STUDY: CPT | Mod: 26

## 2022-03-27 PROCEDURE — 74177 CT ABD & PELVIS W/CONTRAST: CPT | Mod: 26

## 2022-03-27 PROCEDURE — 93010 ELECTROCARDIOGRAM REPORT: CPT

## 2022-03-27 PROCEDURE — 71260 CT THORAX DX C+: CPT | Mod: 26

## 2022-03-28 PROCEDURE — 71275 CT ANGIOGRAPHY CHEST: CPT | Mod: 26

## 2022-03-28 PROCEDURE — 93306 TTE W/DOPPLER COMPLETE: CPT | Mod: 26

## 2022-03-29 ENCOUNTER — OUTPATIENT (OUTPATIENT)
Dept: OUTPATIENT SERVICES | Facility: HOSPITAL | Age: 82
LOS: 1 days | End: 2022-03-29

## 2022-03-29 DIAGNOSIS — Z98.890 OTHER SPECIFIED POSTPROCEDURAL STATES: Chronic | ICD-10-CM

## 2022-03-29 DIAGNOSIS — Z95.1 PRESENCE OF AORTOCORONARY BYPASS GRAFT: Chronic | ICD-10-CM

## 2022-03-29 DIAGNOSIS — Z95.5 PRESENCE OF CORONARY ANGIOPLASTY IMPLANT AND GRAFT: Chronic | ICD-10-CM

## 2022-03-29 PROCEDURE — 93970 EXTREMITY STUDY: CPT | Mod: 26

## 2022-03-30 ENCOUNTER — EMERGENCY (EMERGENCY)
Facility: HOSPITAL | Age: 82
LOS: 1 days | Discharge: HOME | End: 2022-03-30
Payer: MEDICARE

## 2022-03-30 ENCOUNTER — OUTPATIENT (OUTPATIENT)
Dept: OUTPATIENT SERVICES | Facility: HOSPITAL | Age: 82
LOS: 1 days | End: 2022-03-30

## 2022-03-30 DIAGNOSIS — E87.6 HYPOKALEMIA: ICD-10-CM

## 2022-03-30 DIAGNOSIS — Z98.890 OTHER SPECIFIED POSTPROCEDURAL STATES: Chronic | ICD-10-CM

## 2022-03-30 DIAGNOSIS — R11.0 NAUSEA: ICD-10-CM

## 2022-03-30 DIAGNOSIS — Z79.02 LONG TERM (CURRENT) USE OF ANTITHROMBOTICS/ANTIPLATELETS: ICD-10-CM

## 2022-03-30 DIAGNOSIS — Z95.1 PRESENCE OF AORTOCORONARY BYPASS GRAFT: Chronic | ICD-10-CM

## 2022-03-30 DIAGNOSIS — I73.9 PERIPHERAL VASCULAR DISEASE, UNSPECIFIED: ICD-10-CM

## 2022-03-30 DIAGNOSIS — R07.81 PLEURODYNIA: ICD-10-CM

## 2022-03-30 DIAGNOSIS — Z95.5 PRESENCE OF CORONARY ANGIOPLASTY IMPLANT AND GRAFT: Chronic | ICD-10-CM

## 2022-03-30 DIAGNOSIS — E86.0 DEHYDRATION: ICD-10-CM

## 2022-03-30 DIAGNOSIS — I25.10 ATHEROSCLEROTIC HEART DISEASE OF NATIVE CORONARY ARTERY WITHOUT ANGINA PECTORIS: ICD-10-CM
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
## 2022-03-30 DIAGNOSIS — D64.9 ANEMIA, UNSPECIFIED: ICD-10-CM

## 2022-03-30 DIAGNOSIS — U07.1 COVID-19: ICD-10-CM

## 2022-03-30 DIAGNOSIS — Z79.82 LONG TERM (CURRENT) USE OF ASPIRIN: ICD-10-CM

## 2022-03-30 DIAGNOSIS — I10 ESSENTIAL (PRIMARY) HYPERTENSION: ICD-10-CM

## 2022-03-30 DIAGNOSIS — R26.9 UNSPECIFIED ABNORMALITIES OF GAIT AND MOBILITY: ICD-10-CM

## 2022-03-30 DIAGNOSIS — N17.9 ACUTE KIDNEY FAILURE, UNSPECIFIED: ICD-10-CM

## 2022-03-30 DIAGNOSIS — I71.4 ABDOMINAL AORTIC ANEURYSM, WITHOUT RUPTURE: ICD-10-CM
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
## 2022-03-30 DIAGNOSIS — Z95.5 PRESENCE OF CORONARY ANGIOPLASTY IMPLANT AND GRAFT: ICD-10-CM

## 2022-03-30 DIAGNOSIS — D72.829 ELEVATED WHITE BLOOD CELL COUNT, UNSPECIFIED: ICD-10-CM

## 2022-03-30 DIAGNOSIS — Z87.891 PERSONAL HISTORY OF NICOTINE DEPENDENCE: ICD-10-CM

## 2022-03-30 DIAGNOSIS — I25.2 OLD MYOCARDIAL INFARCTION: ICD-10-CM

## 2022-03-30 DIAGNOSIS — R55 SYNCOPE AND COLLAPSE: ICD-10-CM
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
## 2022-03-30 DIAGNOSIS — Z91.81 HISTORY OF FALLING: ICD-10-CM

## 2022-03-30 PROCEDURE — 72125 CT NECK SPINE W/O DYE: CPT | Mod: 26

## 2022-03-30 PROCEDURE — 71045 X-RAY EXAM CHEST 1 VIEW: CPT | Mod: 26

## 2022-03-30 PROCEDURE — 71260 CT THORAX DX C+: CPT | Mod: 26

## 2022-03-30 PROCEDURE — 99285 EMERGENCY DEPT VISIT HI MDM: CPT | Mod: FS

## 2022-03-30 PROCEDURE — 74177 CT ABD & PELVIS W/CONTRAST: CPT | Mod: 26

## 2022-03-30 PROCEDURE — 70486 CT MAXILLOFACIAL W/O DYE: CPT | Mod: 26

## 2022-03-30 PROCEDURE — 93010 ELECTROCARDIOGRAM REPORT: CPT

## 2022-03-30 PROCEDURE — 70450 CT HEAD/BRAIN W/O DYE: CPT | Mod: 26

## 2022-03-31 ENCOUNTER — OUTPATIENT (OUTPATIENT)
Dept: OUTPATIENT SERVICES | Facility: HOSPITAL | Age: 82
LOS: 1 days | End: 2022-03-31

## 2022-03-31 DIAGNOSIS — Z98.890 OTHER SPECIFIED POSTPROCEDURAL STATES: Chronic | ICD-10-CM

## 2022-03-31 DIAGNOSIS — Z95.1 PRESENCE OF AORTOCORONARY BYPASS GRAFT: Chronic | ICD-10-CM

## 2022-03-31 DIAGNOSIS — Z95.5 PRESENCE OF CORONARY ANGIOPLASTY IMPLANT AND GRAFT: Chronic | ICD-10-CM

## 2022-04-01 ENCOUNTER — OUTPATIENT (OUTPATIENT)
Dept: OUTPATIENT SERVICES | Facility: HOSPITAL | Age: 82
LOS: 1 days | End: 2022-04-01

## 2022-04-01 DIAGNOSIS — Z98.890 OTHER SPECIFIED POSTPROCEDURAL STATES: Chronic | ICD-10-CM

## 2022-04-01 DIAGNOSIS — Z95.5 PRESENCE OF CORONARY ANGIOPLASTY IMPLANT AND GRAFT: Chronic | ICD-10-CM

## 2022-04-01 DIAGNOSIS — Z95.1 PRESENCE OF AORTOCORONARY BYPASS GRAFT: Chronic | ICD-10-CM

## 2022-04-05 DIAGNOSIS — S32.10XA UNSPECIFIED FRACTURE OF SACRUM, INITIAL ENCOUNTER FOR CLOSED FRACTURE: ICD-10-CM

## 2022-04-05 DIAGNOSIS — I25.119 ATHEROSCLEROTIC HEART DISEASE OF NATIVE CORONARY ARTERY WITH UNSPECIFIED ANGINA PECTORIS: ICD-10-CM

## 2022-04-11 DIAGNOSIS — I25.119 ATHEROSCLEROTIC HEART DISEASE OF NATIVE CORONARY ARTERY WITH UNSPECIFIED ANGINA PECTORIS: ICD-10-CM

## 2022-04-11 DIAGNOSIS — S32.10XA UNSPECIFIED FRACTURE OF SACRUM, INITIAL ENCOUNTER FOR CLOSED FRACTURE: ICD-10-CM

## 2022-04-12 NOTE — ED PROVIDER NOTE - PROGRESS NOTE DETAILS
Angles are narrow and at risk for occlusion. Recommending prophylactic YAG laser iridotomy to prevent any ocular complication. to so am doctor disposition with Dr. ball, admission or consult in the er for ercp. call placed to Dr. Timothy Quiroz DO spoke with Dr. Aguirre and Dr. Shala mckeon, repeat labs, npo, fluids B Gabriella DO

## 2022-04-18 DIAGNOSIS — R55 SYNCOPE AND COLLAPSE: ICD-10-CM

## 2022-04-18 DIAGNOSIS — E78.5 HYPERLIPIDEMIA, UNSPECIFIED: ICD-10-CM

## 2022-04-18 DIAGNOSIS — I10 ESSENTIAL (PRIMARY) HYPERTENSION: ICD-10-CM

## 2022-04-18 DIAGNOSIS — I25.119 ATHEROSCLEROTIC HEART DISEASE OF NATIVE CORONARY ARTERY WITH UNSPECIFIED ANGINA PECTORIS: ICD-10-CM

## 2022-04-18 DIAGNOSIS — D72.829 ELEVATED WHITE BLOOD CELL COUNT, UNSPECIFIED: ICD-10-CM

## 2022-04-18 DIAGNOSIS — U07.1 COVID-19: ICD-10-CM

## 2022-04-20 DIAGNOSIS — D72.829 ELEVATED WHITE BLOOD CELL COUNT, UNSPECIFIED: ICD-10-CM

## 2022-04-20 DIAGNOSIS — U07.1 COVID-19: ICD-10-CM

## 2022-04-20 DIAGNOSIS — R26.89 OTHER ABNORMALITIES OF GAIT AND MOBILITY: ICD-10-CM

## 2022-04-20 DIAGNOSIS — E78.5 HYPERLIPIDEMIA, UNSPECIFIED: ICD-10-CM

## 2022-04-20 DIAGNOSIS — I10 ESSENTIAL (PRIMARY) HYPERTENSION: ICD-10-CM

## 2022-04-22 DIAGNOSIS — D72.829 ELEVATED WHITE BLOOD CELL COUNT, UNSPECIFIED: ICD-10-CM

## 2022-04-22 DIAGNOSIS — E78.5 HYPERLIPIDEMIA, UNSPECIFIED: ICD-10-CM

## 2022-04-22 DIAGNOSIS — I10 ESSENTIAL (PRIMARY) HYPERTENSION: ICD-10-CM

## 2022-04-22 DIAGNOSIS — Z91.81 HISTORY OF FALLING: ICD-10-CM

## 2022-04-22 DIAGNOSIS — R26.89 OTHER ABNORMALITIES OF GAIT AND MOBILITY: ICD-10-CM

## 2022-04-22 DIAGNOSIS — U07.1 COVID-19: ICD-10-CM

## 2022-05-05 ENCOUNTER — OUTPATIENT (OUTPATIENT)
Dept: OUTPATIENT SERVICES | Facility: HOSPITAL | Age: 82
LOS: 1 days | End: 2022-05-05

## 2022-05-05 DIAGNOSIS — Z98.890 OTHER SPECIFIED POSTPROCEDURAL STATES: Chronic | ICD-10-CM

## 2022-05-05 DIAGNOSIS — R03.0 ELEVATED BLOOD-PRESSURE READING, WITHOUT DIAGNOSIS OF HYPERTENSION: ICD-10-CM

## 2022-05-05 DIAGNOSIS — Z95.5 PRESENCE OF CORONARY ANGIOPLASTY IMPLANT AND GRAFT: Chronic | ICD-10-CM

## 2022-05-05 DIAGNOSIS — D64.9 ANEMIA, UNSPECIFIED: ICD-10-CM

## 2022-05-05 DIAGNOSIS — Z95.1 PRESENCE OF AORTOCORONARY BYPASS GRAFT: Chronic | ICD-10-CM

## 2022-06-01 ENCOUNTER — OUTPATIENT (OUTPATIENT)
Dept: OUTPATIENT SERVICES | Facility: HOSPITAL | Age: 82
LOS: 1 days | End: 2022-06-01

## 2022-06-01 DIAGNOSIS — Z98.890 OTHER SPECIFIED POSTPROCEDURAL STATES: Chronic | ICD-10-CM

## 2022-06-01 DIAGNOSIS — I25.119 ATHEROSCLEROTIC HEART DISEASE OF NATIVE CORONARY ARTERY WITH UNSPECIFIED ANGINA PECTORIS: ICD-10-CM

## 2022-06-01 DIAGNOSIS — Z95.1 PRESENCE OF AORTOCORONARY BYPASS GRAFT: Chronic | ICD-10-CM

## 2022-06-01 DIAGNOSIS — Z95.5 PRESENCE OF CORONARY ANGIOPLASTY IMPLANT AND GRAFT: Chronic | ICD-10-CM

## 2022-06-09 ENCOUNTER — OUTPATIENT (OUTPATIENT)
Dept: OUTPATIENT SERVICES | Facility: HOSPITAL | Age: 82
LOS: 1 days | End: 2022-06-09

## 2022-06-09 DIAGNOSIS — Z98.890 OTHER SPECIFIED POSTPROCEDURAL STATES: Chronic | ICD-10-CM

## 2022-06-09 DIAGNOSIS — R06.00 DYSPNEA, UNSPECIFIED: ICD-10-CM

## 2022-06-09 DIAGNOSIS — Z95.1 PRESENCE OF AORTOCORONARY BYPASS GRAFT: Chronic | ICD-10-CM

## 2022-06-09 DIAGNOSIS — Z95.5 PRESENCE OF CORONARY ANGIOPLASTY IMPLANT AND GRAFT: Chronic | ICD-10-CM

## 2022-07-09 DIAGNOSIS — U07.1 COVID-19: ICD-10-CM

## 2022-07-09 DIAGNOSIS — I25.119 ATHEROSCLEROTIC HEART DISEASE OF NATIVE CORONARY ARTERY WITH UNSPECIFIED ANGINA PECTORIS: ICD-10-CM

## 2022-07-09 DIAGNOSIS — I10 ESSENTIAL (PRIMARY) HYPERTENSION: ICD-10-CM

## 2022-07-09 DIAGNOSIS — E78.5 HYPERLIPIDEMIA, UNSPECIFIED: ICD-10-CM

## 2022-07-09 DIAGNOSIS — D72.829 ELEVATED WHITE BLOOD CELL COUNT, UNSPECIFIED: ICD-10-CM

## 2022-07-09 DIAGNOSIS — R55 SYNCOPE AND COLLAPSE: ICD-10-CM

## 2022-08-24 ENCOUNTER — OUTPATIENT (OUTPATIENT)
Dept: OUTPATIENT SERVICES | Facility: HOSPITAL | Age: 82
LOS: 1 days | End: 2022-08-24

## 2022-08-24 DIAGNOSIS — D64.9 ANEMIA, UNSPECIFIED: ICD-10-CM

## 2022-08-24 DIAGNOSIS — Z95.5 PRESENCE OF CORONARY ANGIOPLASTY IMPLANT AND GRAFT: Chronic | ICD-10-CM

## 2022-08-24 DIAGNOSIS — Z98.890 OTHER SPECIFIED POSTPROCEDURAL STATES: Chronic | ICD-10-CM

## 2022-08-24 DIAGNOSIS — Z95.1 PRESENCE OF AORTOCORONARY BYPASS GRAFT: Chronic | ICD-10-CM

## 2022-09-12 ENCOUNTER — APPOINTMENT (OUTPATIENT)
Dept: OPHTHALMOLOGY | Facility: CLINIC | Age: 82
End: 2022-09-12

## 2022-09-12 ENCOUNTER — NON-APPOINTMENT (OUTPATIENT)
Age: 82
End: 2022-09-12

## 2022-09-12 PROCEDURE — 92014 COMPRE OPH EXAM EST PT 1/>: CPT

## 2022-09-12 PROCEDURE — 92250 FUNDUS PHOTOGRAPHY W/I&R: CPT

## 2022-11-02 ENCOUNTER — APPOINTMENT (OUTPATIENT)
Dept: CT IMAGING | Facility: CLINIC | Age: 82
End: 2022-11-02

## 2022-11-02 PROCEDURE — 70470 CT HEAD/BRAIN W/O & W/DYE: CPT | Mod: MH

## 2022-11-02 PROCEDURE — 70491 CT SOFT TISSUE NECK W/DYE: CPT | Mod: MH

## 2022-12-14 NOTE — PROGRESS NOTE ADULT - NSICDXPILOT_GEN_ALL_CORE
Patient is requesting a referral for outpatient PT. Patient would like a call when it is ready to discuss where it can be sent.
[place]

## 2023-03-03 ENCOUNTER — RESULT REVIEW (OUTPATIENT)
Age: 83
End: 2023-03-03

## 2023-03-03 ENCOUNTER — APPOINTMENT (OUTPATIENT)
Dept: ULTRASOUND IMAGING | Facility: CLINIC | Age: 83
End: 2023-03-03
Payer: MEDICARE

## 2023-03-03 PROCEDURE — 76700 US EXAM ABDOM COMPLETE: CPT

## 2023-03-13 ENCOUNTER — APPOINTMENT (OUTPATIENT)
Dept: OPHTHALMOLOGY | Facility: CLINIC | Age: 83
End: 2023-03-13
Payer: MEDICARE

## 2023-03-13 ENCOUNTER — NON-APPOINTMENT (OUTPATIENT)
Age: 83
End: 2023-03-13

## 2023-03-13 PROCEDURE — 92134 CPTRZ OPH DX IMG PST SGM RTA: CPT

## 2023-03-13 PROCEDURE — 92014 COMPRE OPH EXAM EST PT 1/>: CPT

## 2023-10-26 ENCOUNTER — APPOINTMENT (OUTPATIENT)
Dept: OPHTHALMOLOGY | Facility: CLINIC | Age: 83
End: 2023-10-26

## 2023-11-06 ENCOUNTER — APPOINTMENT (OUTPATIENT)
Dept: OPHTHALMOLOGY | Facility: CLINIC | Age: 83
End: 2023-11-06
Payer: MEDICARE

## 2023-11-06 ENCOUNTER — NON-APPOINTMENT (OUTPATIENT)
Age: 83
End: 2023-11-06

## 2023-11-06 PROCEDURE — 92014 COMPRE OPH EXAM EST PT 1/>: CPT

## 2024-01-17 ENCOUNTER — TRANSCRIPTION ENCOUNTER (OUTPATIENT)
Age: 84
End: 2024-01-17

## 2024-01-25 ENCOUNTER — APPOINTMENT (OUTPATIENT)
Dept: RADIOLOGY | Facility: CLINIC | Age: 84
End: 2024-01-25
Payer: MEDICARE

## 2024-01-25 PROCEDURE — 71046 X-RAY EXAM CHEST 2 VIEWS: CPT

## 2024-01-30 ENCOUNTER — APPOINTMENT (OUTPATIENT)
Dept: ULTRASOUND IMAGING | Facility: CLINIC | Age: 84
End: 2024-01-30

## 2024-02-14 ENCOUNTER — TRANSCRIPTION ENCOUNTER (OUTPATIENT)
Age: 84
End: 2024-02-14

## 2024-02-16 NOTE — ASU DISCHARGE PLAN (ADULT/PEDIATRIC) - D. IF YOU HAD ANY TYPE OF SEDATION, YOU MAY EXPERIENCE LIGHTHEADEDNESS, DIZZINESS, OR SLEEPINESS FOLLOWING YOUR PROCEDURE. A RESPONSIBLE ADULT SHOULD STAY WITH YOU FOR AT LEAST 24 HOURS FOLLOWING YOUR PROCEDURE.
Encounter Date: 2/15/2024    SCRIBE #1 NOTE: ILonnie, am scribing for, and in the presence of,  Maria D Gomez MD.       History     Chief Complaint   Patient presents with    Wound Check     Was seen at  for wound to RIGHT thigh/labia and given doxy on 2/10. States not getting any better. Hx DM2     55 y/o female with a PMHx of DM type 2, emphysema, HTN, BAIRON, CHF (TTE dated 2023 showed EF 53%), Microcytic anemia, pulmonary HTN, who presents to the ED for evaluation of wound check today. Patient reports she was initially seen at [facility] for a wound in her right sided groin where she reports having an I&D as well as being placed on p.o. doxycycline x1 week.     She reports her complaints have acutely worsened since yesterday, with subjective fever, chills and her pain extends to her right thigh and is extremely severe. She also reports her CBGs were in the 500s. Reports last BM 2 days ago. She notes compliance with insulin. Per chart review, patient was reportedly not taking her insulin and antihypertensives on 02/10/2024. No medications attempted for treatment. She also reports she had a fall a week ago after losing her balance. No alleviating or exacerbating factors noted. Denies diarrhea, nausea, vomiting, fever, chills, or other associated symptoms.         The history is provided by the patient. No  was used.     Review of patient's allergies indicates:   Allergen Reactions    Hydrochlorothiazide plus      Past Medical History:   Diagnosis Date    Anxiety     Arthritis     Bronchitis     CHF (congestive heart failure)     Diabetic ketoacidosis associated with type 2 diabetes mellitus 3/10/2023    DM (diabetes mellitus)     Emphysema lung     Encounter for blood transfusion     HTN (hypertension)     Restrictive lung disease     Sleep apnea      Past Surgical History:   Procedure Laterality Date     SECTION      PALATAL EXPANSION      WRIST SURGERY Right      Family  History   Problem Relation Age of Onset    Diabetes Mother     Hypertension Mother     Hypertension Father     Diabetes Father     Diabetes Sister      Social History     Tobacco Use    Smoking status: Some Days     Current packs/day: 0.25     Types: Cigarettes    Smokeless tobacco: Never   Substance Use Topics    Alcohol use: Yes    Drug use: Yes     Types: Marijuana     Review of Systems   Constitutional:  Negative for fever.   HENT:  Negative for sore throat.    Respiratory:  Negative for cough and shortness of breath.    Cardiovascular:  Negative for chest pain and leg swelling.   Gastrointestinal:  Negative for abdominal pain, diarrhea, nausea and vomiting.   Genitourinary:  Negative for dysuria and hematuria.   Musculoskeletal:  Negative for back pain and neck pain.   Skin:  Positive for wound. Negative for rash.   Neurological:  Negative for syncope.       Physical Exam     Initial Vitals [02/15/24 2235]   BP Pulse Resp Temp SpO2   (!) 140/80 90 18 99.1 °F (37.3 °C) 99 %      MAP       --         Physical Exam    Nursing note and vitals reviewed.  Constitutional:  Non-toxic appearance. No distress.   HENT:   Head: Atraumatic.   Mouth/Throat: Mucous membranes are normal.   Eyes: Conjunctivae and EOM are normal.   Cardiovascular:  Normal rate, regular rhythm, normal heart sounds and intact distal pulses.           No murmur heard.  2+ DP pulses bilaterally.    Pulmonary/Chest: Breath sounds normal. No respiratory distress. She has no wheezes. She has no rhonchi.   Abdominal: Abdomen is soft. She exhibits no distension. There is no abdominal tenderness.   No abdominal tenderness on palpation.    No right CVA tenderness.  No left CVA tenderness. There is no guarding.     Neurological: She is alert and oriented to person, place, and time. No cranial nerve deficit or sensory deficit.   Skin:   Right perineal area with erythema and active purulent drainage. 2 surgical incisions around site. 15 cm x 15 cm indurated  area extending from the right perineum distally into thigh    Please refer to attached media.                ED Course   Procedures  Labs Reviewed   CBC W/ AUTO DIFFERENTIAL - Abnormal; Notable for the following components:       Result Value    WBC 26.34 (*)     Hemoglobin 9.8 (*)     Hematocrit 30.6 (*)     MCV 68 (*)     MCH 21.7 (*)     RDW 14.9 (*)     Immature Granulocytes 2.6 (*)     Gran # (ANC) 21.9 (*)     Immature Grans (Abs) 0.68 (*)     Mono # 1.9 (*)     Gran % 83.1 (*)     Lymph % 6.7 (*)     All other components within normal limits   COMPREHENSIVE METABOLIC PANEL - Abnormal; Notable for the following components:    Sodium 128 (*)     Chloride 93 (*)     Glucose 384 (*)     Creatinine 1.8 (*)     Albumin 1.5 (*)     Alkaline Phosphatase 153 (*)     ALT 7 (*)     eGFR 33 (*)     All other components within normal limits   C-REACTIVE PROTEIN - Abnormal; Notable for the following components:    .0 (*)     All other components within normal limits   ISTAT LACTATE - Abnormal; Notable for the following components:    POC Lactate 2.89 (*)     All other components within normal limits   ISTAT PROCEDURE - Abnormal; Notable for the following components:    POC Glucose 356 (*)     POC Creatinine 1.7 (*)     POC Sodium 128 (*)     POC Chloride 90 (*)     POC Hematocrit 34 (*)     All other components within normal limits   CK   LACTIC ACID, PLASMA        ECG Results              EKG 12-LEAD (Final result)  Result time 02/16/24 11:55:51      Final result by Unknown User (02/16/24 11:55:51)                                      Imaging Results              CT Pelvis With IV Contrast NO Oral Contrast (Final result)  Result time 02/16/24 01:24:33      Final result by Radha Vazquez MD (02/16/24 01:24:33)                   Impression:      Extensive inflammatory changes extending from the right perineum to the right medial thigh. Linear area of fluid attenuation in the right labial region, which may  represent a small abscess.  Multiple foci of air in the right medial thigh, which is difficult to measure. Considerations may include developing abscess, phlegmonous tissue, and or instrumentation resulting in subcutaneous air.      Electronically signed by: Radha Vazquez  Date:    02/16/2024  Time:    01:24               Narrative:    EXAMINATION:  CT PELVIS WITH AND CT THIGH WITH CONTRAST    CLINICAL HISTORY:  Wound check.  Wound to the right thigh/labia.  Sepsis;Soft tissue infection suspected, pelvis, xray done;R peritoneal abscess with extension into R thigh, please scan to R knee for nec fasc rule out;; Soft tissue infection suspected, thigh, xray done;    TECHNIQUE:  1.25 mm enhanced axial images were obtained from the right iliac crest through the right mid thigh.  Seventy mL of Omnipaque 350 was injected.    COMPARISON:  None.    FINDINGS:  CT pelvis and CT right thigh:    There are extensive inflammatory changes extending from the right perineum to the right medial thigh.  There is a linear area of low attenuation paralleling the right inferior labia, measuring approximately 4.1 cm x 1.2 cm x 1.4 cm (series 3 axial image 21 and series 300 coronal image 78).  In addition, there are multiple foci of air seen slightly more caudally in the medial thigh, which may be related to an organizing abscess or phlegmonous tissue.  Is discal to measure there is associated reactive right inguinal adenopathy.  There are 2 horizontally oriented metallic rods across the sacrum with associated streak metallic artifact..  There are remote fractures of bilateral inferior pubic rami.  Anasarca is seen in the subcutaneous tissues.  There is right inguinal adenopathy.  No free fluid is seen in the posterior cul-de-sac.  There is some muscular atrophy.                                       CT Thigh With Contrast Right (Final result)  Result time 02/16/24 01:24:33      Final result by Radha Vazquez MD (02/16/24 01:24:33)                    Impression:      Extensive inflammatory changes extending from the right perineum to the right medial thigh. Linear area of fluid attenuation in the right labial region, which may represent a small abscess.  Multiple foci of air in the right medial thigh, which is difficult to measure. Considerations may include developing abscess, phlegmonous tissue, and or instrumentation resulting in subcutaneous air.      Electronically signed by: Radha Vazquez  Date:    02/16/2024  Time:    01:24               Narrative:    EXAMINATION:  CT PELVIS WITH AND CT THIGH WITH CONTRAST    CLINICAL HISTORY:  Wound check.  Wound to the right thigh/labia.  Sepsis;Soft tissue infection suspected, pelvis, xray done;R peritoneal abscess with extension into R thigh, please scan to R knee for nec fasc rule out;; Soft tissue infection suspected, thigh, xray done;    TECHNIQUE:  1.25 mm enhanced axial images were obtained from the right iliac crest through the right mid thigh.  Seventy mL of Omnipaque 350 was injected.    COMPARISON:  None.    FINDINGS:  CT pelvis and CT right thigh:    There are extensive inflammatory changes extending from the right perineum to the right medial thigh.  There is a linear area of low attenuation paralleling the right inferior labia, measuring approximately 4.1 cm x 1.2 cm x 1.4 cm (series 3 axial image 21 and series 300 coronal image 78).  In addition, there are multiple foci of air seen slightly more caudally in the medial thigh, which may be related to an organizing abscess or phlegmonous tissue.  Is discal to measure there is associated reactive right inguinal adenopathy.  There are 2 horizontally oriented metallic rods across the sacrum with associated streak metallic artifact..  There are remote fractures of bilateral inferior pubic rami.  Anasarca is seen in the subcutaneous tissues.  There is right inguinal adenopathy.  No free fluid is seen in the posterior cul-de-sac.  There is some  muscular atrophy.                                       X-Ray Femur Ap/Lat Right (Final result)  Result time 02/15/24 23:53:17      Final result by Librado Linton MD (02/15/24 23:53:17)                   Impression:      Subcutaneous stranding throughout the visualized right lower extremity, possibly related to peripheral edema.  Cellulitis or other infiltrative pathology not excluded.  Correlate clinically.    No acute fracture deformity or dislocation of the right femur.    Degenerative changes in the right hip, and especially, in the right knee.      Electronically signed by: Librado Linton  Date:    02/15/2024  Time:    23:53               Narrative:    EXAMINATION:  XR FEMUR 2 VIEW RIGHT    CLINICAL HISTORY:  Sepsis, unspecified organism    TECHNIQUE:  AP and lateral views of the right femur were performed.    COMPARISON:  None    FINDINGS:  No acute fracture deformity or dislocation of the right femur.    Extensive subcutaneous stranding in the right thigh, extending about the knee and into the right lower leg.    No soft tissue emphysema or radiopaque foreign bodies apparent radiographically.    Old healed right proximal fibular fracture.    Advanced degenerative changes in the knee with marked narrowing of the lateral tibiofemoral compartment, mediolateral tibiofemoral marginal osteophytes, and likely some patellofemoral joint narrowing.    Small exostosis or tug lesion arising from the medial aspect of the distal right femoral metaphysis.    Degenerate changes in the right hip, with some joint space narrowing and mild subchondral sclerosis in the acetabular roof.                                       X-Ray Chest AP Portable (Final result)  Result time 02/15/24 23:46:50      Final result by Radha Vazquez MD (02/15/24 23:46:50)                   Impression:      No acute intrathoracic abnormality detected.      Electronically signed by: Radha Vazquez  Date:    02/15/2024  Time:    23:46                Narrative:    EXAMINATION:  AP PORTABLE CHEST    CLINICAL HISTORY:  Sepsis;    TECHNIQUE:  AP portable chest radiograph was submitted.    COMPARISON:  04/06/2023    FINDINGS:  AP portable chest radiograph demonstrates a cardiac silhouette within normal limits.  There is no focal consolidation, pneumothorax, or pleural effusion.  There is no 11 x 7 mm metallic fragment again projecting over the mid abdomen.                                       X-Ray Pelvis Routine AP (Final result)  Result time 02/15/24 23:43:20      Final result by Yakov Laura DO (02/15/24 23:43:20)                   Impression:      No acute osseous abnormality.      Electronically signed by: Yakov Laura  Date:    02/15/2024  Time:    23:43               Narrative:    EXAMINATION:  XR PELVIS ROUTINE AP    CLINICAL HISTORY:  infection;    TECHNIQUE:  AP view of the pelvis was performed.    COMPARISON:  None.    FINDINGS:  There is hardware overlying the sacrum and the bilateral sacroiliac joints.  Hardware appears intact.  There is no acute fracture or dislocation of the pelvis on this single, limited view.  The femoral heads are well seated in the acetabula.                                       Medications   amLODIPine tablet 10 mg (10 mg Oral Not Given 2/18/24 0900)   aspirin chewable tablet 81 mg (81 mg Oral Given 2/18/24 0839)   atorvastatin tablet 20 mg (20 mg Oral Given 2/18/24 0839)   pantoprazole EC tablet 40 mg (40 mg Oral Given 2/18/24 0839)   LIDOcaine (PF) 10 mg/ml (1%) injection 10 mg (has no administration in time range)   sodium chloride 0.9% flush 10 mL (has no administration in time range)   ondansetron disintegrating tablet 8 mg (has no administration in time range)   melatonin tablet 6 mg (6 mg Oral Given 2/16/24 2151)   enoxaparin injection 40 mg (40 mg Subcutaneous Given 2/17/24 1625)   vancomycin - pharmacy to dose (has no administration in time range)   acetaminophen tablet 1,000 mg (1,000 mg Oral Given 2/18/24  1328)   oxyCODONE immediate release tablet 5 mg (5 mg Oral Given 2/17/24 2022)   oxyCODONE immediate release tablet 10 mg (10 mg Oral Given 2/18/24 0839)   glucose chewable tablet 16 g (has no administration in time range)   glucose chewable tablet 24 g (has no administration in time range)   glucagon (human recombinant) injection 1 mg (has no administration in time range)   prochlorperazine injection Soln 5 mg (has no administration in time range)   dextrose 10% bolus 125 mL 125 mL (has no administration in time range)   dextrose 10% bolus 250 mL 250 mL (has no administration in time range)   albuterol sulfate nebulizer solution 2.5 mg (has no administration in time range)   budesonide nebulizer solution 1 mg (1 mg Nebulization Not Given 2/18/24 0800)     And   arformoteroL nebulizer solution 15 mcg (15 mcg Nebulization Not Given 2/18/24 0800)   insulin detemir U-100 (Levemir) pen 30 Units (30 Units Subcutaneous Given 2/18/24 0859)   insulin detemir U-100 (Levemir) pen 15 Units (15 Units Subcutaneous Given 2/17/24 2035)   insulin aspart U-100 pen 0-15 Units (6 Units Subcutaneous Given 2/18/24 0900)   magnesium oxide tablet 500 mg (500 mg Oral Given 2/18/24 0915)   thiamine tablet 100 mg (100 mg Oral Given 2/18/24 0839)   folic acid tablet 1 mg (1 mg Oral Given 2/18/24 0839)   chlordiazepoxide capsule 5 mg (5 mg Oral Given 2/18/24 1328)   calcium gluconate 1 g in NS IVPB (premixed) (0 g Intravenous Hold 2/17/24 0945)   meropenem (MERREM) 1 g in sodium chloride 0.9 % 100 mL IVPB (MB+) (0 g Intravenous Stopped 2/18/24 1301)   HYDROmorphone injection 0.5 mg (0.5 mg Intravenous Given 2/18/24 0138)   gabapentin capsule 100 mg (100 mg Oral Given 2/18/24 0839)   mupirocin 2 % ointment ( Nasal Given 2/18/24 0900)   sodium bicarbonate tablet 1,300 mg (1,300 mg Oral Given 2/18/24 0839)   lactated ringers bolus 1,000 mL (0 mLs Intravenous Stopped 2/16/24 0100)   morphine injection 4 mg (4 mg Intravenous Given 2/15/24 8569)    vancomycin (VANCOCIN) 1,750 mg in dextrose 5 % (D5W) 500 mL IVPB (0 mg Intravenous Stopped 2/16/24 0258)   clindamycin in D5W 900 mg/50 mL IVPB 900 mg (0 mg Intravenous Stopped 2/16/24 0051)   iohexoL (OMNIPAQUE 350) injection 70 mL (70 mLs Intravenous Given 2/16/24 0013)   fentaNYL 50 mcg/mL injection 50 mcg (50 mcg Intravenous Given 2/16/24 0029)   ketorolac injection 15 mg (15 mg Intravenous Given 2/16/24 0135)   fentaNYL 50 mcg/mL injection 25 mcg (25 mcg Intravenous Given 2/16/24 0135)   lactated ringers bolus 1,000 mL (0 mLs Intravenous Stopped 2/16/24 0258)   magnesium oxide tablet 500 mg (500 mg Oral Given 2/16/24 0857)   potassium, sodium phosphates 280-160-250 mg packet 1 packet (1 packet Oral Given 2/16/24 2219)   insulin regular injection 5 Units 0.05 mL (5 Units Intravenous Given 2/16/24 1825)   magnesium sulfate 2g in water 50mL IVPB (premix) (0 g Intravenous Stopped 2/18/24 1042)   calcium gluconate 1 g in NS IVPB (premixed) (0 g Intravenous Stopped 2/18/24 1127)   albumin human 25% bottle 25 g (0 g Intravenous Stopped 2/18/24 1126)     Medical Decision Making  57 y/o female with a PMHx of DM type 2, emphysema, HTN, BAIRON, CHF (TTE dated 03/13/2023 showed EF 53%), Microcytic anemia, pulmonary HTN, who presents to the ED for evaluation of wound check   today.     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  X-ray thigh/pelvis negative for subcutaneous emphysema, subcutaneous stranding seen the right knee, no significant effusion seen on clinical exam, with full flexion of the right knee, low concern for septic  joint.    Lab significant for leukocytosis to 26, hyponatremia to 128 (corrected to 133 with hyperglycemia to 384), blood cultures obtained, CRP elevated 357, with POC lactate of 2.8, new CARLINE creatinine of 1.8 (baseline of 1.2), CT of the thigh pending.  Given severity pain, out of proportion on exam, extensive worsening of indurated region in the last day, soft tissue infection refractory to 1 week of p.o. antibiotics, will discuss with surgery.  LRINEC score of 11, high risk for necfasc.Given broad-spectrum antibiotics to cover for necrotizing fasciitis/Librado gangrene, vancomycin, clindamycin, Zosyn.      This patient does have evidence of infective focus  My overall impression is sepsis.  Source: Skin and Soft Tissue (location R thigh, R peritoneum)  Antibiotics given- Antibiotics (72h ago, onward)    Start     Stop Route Frequency Ordered    02/16/24 0015  piperacillin-tazobactam (ZOSYN) 4.5 g in dextrose 5 % in   water (D5W) 100 mL IVPB (MB+)         -- IV Every 8 hours (non-standard times) 02/15/24 2307    02/15/24 2315  vancomycin (VANCOCIN) 1,750 mg in dextrose 5 % (D5W) 500   mL IVPB         02/16/24 1114 IV ED 1 Time 02/15/24 2307      Latest lactate reviewed-  Lab             02/15/24                       2324          POCLAC       2.89*         Organ dysfunction indicated by Acute kidney injury    Fluid challenge:  Given 1 L IVF only due to CARLINE, history of CHF    Post- resuscitation assessment Yes Perfusion exam was performed within 6 hours of septic shock presentation after bolus shows Adequate tissue perfusion assessed by non-invasive monitoring       Will Not start Pressors- Levophed for MAP of 65  Source control achieved by: vanc, zosyn, clinda        Amount and/or Complexity of Data Reviewed  External Data Reviewed: labs and notes.     Details: External documents reviewed: Patient was initially seen at [facility] on 02/10/2024 with multiple complaints, where she had endorsed noncompliance with her  insulin due to moving a month prior and running out of her CBG strips. She reported an abscess to her right labia that extended into the groin. She had an I&D performed on a 5 cm abscess. Additionally, labs showed Leukocytosis of 20,000 noted without evidence of anemia. Metabolic panel significant for hyperglycemia without evidence of anion gap acidosis. She was started on a course of doxycycline on discharge.  Labs: ordered. Decision-making details documented in ED Course.  Radiology: ordered.    Risk  Prescription drug management.  Decision regarding hospitalization.            Scribe Attestation:   Scribe #1: I performed the above scribed service and the documentation accurately describes the services I performed. I attest to the accuracy of the note.        ED Course as of 02/18/24 1416   Thu Feb 15, 2024   2334 POC Lactate(!): 2.89 [LF]   2334 Sodium, Blood Gas(!): 128  EKG independently interpreted by me with normal sinus rhythm, rate of 95, no STEMI, flattened T-waves in anterior leads, Q-wave in anterior lead and poor R-wave progression previously seen on EKG done in June 2023, [LF]   2353 POC Glucose(!): 356 [LF]   2354 WBC(!): 26.34 [LF]   Fri Feb 16, 2024   0128 CT of the thigh concerning for nec fasc/estephanie's with multiple foci of air seen, discussed case with surgery who will come into ER and evaluate the patient [LF]      ED Course User Index  [LF] Maria D Gomez MD     Assumed care patient at shift turnover pending surgical evaluation and patient.  Surgery will take emergently to the OR for operative debridement and admission.  Patient received antibiotics as noted above and vitals appear stable at this point.  She has not appear to be in septic shock currently.  Will be admitted to general surgery at this time.                  Medical Decision Making:   Clinical Tests:   Sepsis Perfusion Assessment: "I attest a sepsis perfusion exam was performed within 6 hours of sepsis, severe sepsis, or septic  shock presentation, following fluid resuscitation."           I, Jose M Tony M.D., personally performed the services described in this documentation. All medical record entries made by the scribe were at my direction and in my presence. I have reviewed the chart and agree that the record reflects my personal performance and is accurate and complete.    Clinical Impression:  Final diagnoses:  [A41.9] Sepsis  [M72.6] Necrotizing fasciitis (Primary)          ED Disposition Condition    Admit Stable                Jose M Tony MD  02/16/24 0556       Jose M Tony MD  02/18/24 1416     Statement Selected

## 2024-03-14 ENCOUNTER — APPOINTMENT (OUTPATIENT)
Dept: RADIOLOGY | Facility: CLINIC | Age: 84
End: 2024-03-14
Payer: MEDICARE

## 2024-03-14 PROCEDURE — 77080 DXA BONE DENSITY AXIAL: CPT

## 2024-03-28 ENCOUNTER — APPOINTMENT (OUTPATIENT)
Dept: MRI IMAGING | Facility: CLINIC | Age: 84
End: 2024-03-28
Payer: MEDICARE

## 2024-03-28 PROCEDURE — 72148 MRI LUMBAR SPINE W/O DYE: CPT | Mod: MH

## 2024-04-10 ENCOUNTER — APPOINTMENT (OUTPATIENT)
Dept: NEUROSURGERY | Facility: CLINIC | Age: 84
End: 2024-04-10
Payer: MEDICARE

## 2024-04-10 VITALS
HEART RATE: 69 BPM | HEIGHT: 62 IN | WEIGHT: 106 LBS | SYSTOLIC BLOOD PRESSURE: 169 MMHG | BODY MASS INDEX: 19.51 KG/M2 | DIASTOLIC BLOOD PRESSURE: 78 MMHG

## 2024-04-10 DIAGNOSIS — S32.020A WEDGE COMPRESSION FRACTURE OF SECOND LUMBAR VERTEBRA, INITIAL ENCOUNTER FOR CLOSED FRACTURE: ICD-10-CM

## 2024-04-10 PROCEDURE — 99204 OFFICE O/P NEW MOD 45 MIN: CPT

## 2024-04-10 RX ORDER — NITROGLYCERIN 0.4 MG/1
0.4 TABLET SUBLINGUAL
Refills: 0 | Status: ACTIVE | COMMUNITY

## 2024-04-10 RX ORDER — ATORVASTATIN CALCIUM 10 MG/1
10 TABLET, FILM COATED ORAL
Refills: 0 | Status: ACTIVE | COMMUNITY

## 2024-04-10 RX ORDER — FUROSEMIDE 20 MG/1
20 TABLET ORAL
Refills: 0 | Status: ACTIVE | COMMUNITY

## 2024-04-10 RX ORDER — PREDNISONE 20 MG/1
20 TABLET ORAL
Refills: 0 | Status: ACTIVE | COMMUNITY

## 2024-04-10 NOTE — ASSESSMENT
[FreeTextEntry1] : Discussed the history, physical examination findings, and recent imaging with the patient with all questions answered.  The patient has an L2 compression fracture deformity noted on the MRI spine imaging.  The patient has no pain or complaints of lower extremity radicular symptoms.  Discussed that she may continue to ambulate as tolerated without the need of a brace.  If symptoms progress or worsen regarding back pain, we may consider providing a brace and physical therapy.  Discussed that there is no role for surgical intervention for the noted spine findings.  It is also advised that the patient follow-up with her primary care doctor regarding the osteoporosis noted on her DEXA scan for medical treatment.  The patient may follow-up in 6 to 8 weeks to note progress and continued improvement.

## 2024-04-10 NOTE — DATA REVIEWED
[de-identified] : Was reviewed of the lumbar spine (care stream) -L2 compression fracture deformity of the superior endplate [de-identified] : Bone density scan (3/14/2024) osteoporosis with risk of fracture increased.  Medical therapy should be considered according to current guidelines

## 2024-04-10 NOTE — REASON FOR VISIT
[New Patient Visit] : a new patient visit [FreeTextEntry1] : Pt is here with complaints of lower back pain. Possible compression fracture.

## 2024-04-10 NOTE — PHYSICAL EXAM
[General Appearance - Alert] : alert [General Appearance - In No Acute Distress] : in no acute distress [General Appearance - Well Nourished] : well nourished [General Appearance - Well Developed] : well developed [General Appearance - Well-Appearing] : healthy appearing [] : normal voice and communication [Oriented To Time, Place, And Person] : oriented to person, place, and time [Impaired Insight] : insight and judgment were intact [Affect] : the affect was normal [Mood] : the mood was normal [Memory Recent] : recent memory was not impaired [Memory Remote] : remote memory was not impaired [Person] : oriented to person [Place] : oriented to place [Time] : oriented to time [Motor Tone] : muscle tone was normal in all four extremities [Motor Strength] : muscle strength was normal in all four extremities [Involuntary Movements] : no involuntary movements were seen [No Muscle Atrophy] : normal bulk in all four extremities [FreeTextEntry8] : The patient ambulates with the aid of a cane [FreeTextEntry1] : Mild right-sided tenderness to palpation

## 2024-04-10 NOTE — HISTORY OF PRESENT ILLNESS
[< 3 months] : less than 3 months [de-identified] : 84-year-old female presents to the neurosurgery office for an initial office visit accompanied by a friend for evaluation of lumbar spine findings.  The patient had a fall approximately 2 to 3 weeks ago at home and complained of some back pain.  The patient's doctor (Dr. Colon) ordered an MRI of the lumbar spine which revealed a L2 compression fracture deformity.  The patient reports that she has little to no pain and no lower extremity radicular symptoms.  The patient does ambulate with the aid of an assistive device (cane when outside and walker when around the house).  The patient also has a DEXA scan available for review which shows osteoporosis.

## 2024-04-28 ENCOUNTER — TRANSCRIPTION ENCOUNTER (OUTPATIENT)
Age: 84
End: 2024-04-28

## 2024-04-29 ENCOUNTER — TRANSCRIPTION ENCOUNTER (OUTPATIENT)
Age: 84
End: 2024-04-29

## 2024-05-13 ENCOUNTER — APPOINTMENT (OUTPATIENT)
Dept: OPHTHALMOLOGY | Facility: CLINIC | Age: 84
End: 2024-05-13

## 2024-05-20 ENCOUNTER — APPOINTMENT (OUTPATIENT)
Dept: NEUROSURGERY | Facility: CLINIC | Age: 84
End: 2024-05-20